# Patient Record
Sex: FEMALE | Race: WHITE | Employment: UNEMPLOYED | URBAN - NONMETROPOLITAN AREA
[De-identification: names, ages, dates, MRNs, and addresses within clinical notes are randomized per-mention and may not be internally consistent; named-entity substitution may affect disease eponyms.]

---

## 2017-01-21 ENCOUNTER — HOSPITAL ENCOUNTER (OUTPATIENT)
Dept: LAB | Age: 82
Discharge: HOME | End: 2017-01-21
Attending: INTERNAL MEDICINE | Admitting: INTERNAL MEDICINE

## 2017-01-21 LAB
ALBUMIN SERPL-MCNC: 4.3 GM/DL (ref 3.4–5)
ALP BLD-CCNC: 77 IU/L (ref 40–129)
ALT SERPL-CCNC: 17 U/L (ref 10–40)
ANION GAP SERPL CALCULATED.3IONS-SCNC: 6 MMOL/L (ref 4–16)
AST SERPL-CCNC: 23 IU/L (ref 15–37)
BILIRUB SERPL-MCNC: 0.2 MG/DL (ref 0–1)
BUN BLDV-MCNC: 23 MG/DL (ref 6–23)
CALCIUM SERPL-MCNC: 8.9 MG/DL (ref 8.3–10.6)
CHLORIDE BLD-SCNC: 107 MMOL/L (ref 99–110)
CO2: 30 MMOL/L (ref 21–32)
CREAT SERPL-MCNC: 0.8 MG/DL (ref 0.6–1.1)
GFR AFRICAN AMERICAN: >60 ML/MIN/1.73M2
GFR NON-AFRICAN AMERICAN: >60 ML/MIN/1.73M2
GLUCOSE FASTING: 97 MG/DL (ref 70–99)
POTASSIUM SERPL-SCNC: 4 MMOL/L (ref 3.5–5.1)
SODIUM BLD-SCNC: 143 MMOL/L (ref 135–145)
TOTAL PROTEIN: 7.2 GM/DL (ref 6.4–8.2)

## 2017-01-22 LAB
CHOLESTEROL: 197 MG/DL
HDLC SERPL-MCNC: 69 MG/DL
LDL CHOLESTEROL DIRECT: 120 MG/DL
TRIGL SERPL-MCNC: 93 MG/DL

## 2022-05-18 ENCOUNTER — HOSPITAL ENCOUNTER (INPATIENT)
Age: 87
LOS: 2 days | Discharge: HOME | DRG: 191 | End: 2022-05-20
Attending: EMERGENCY MEDICINE | Admitting: INTERNAL MEDICINE
Payer: MEDICARE

## 2022-05-18 ENCOUNTER — APPOINTMENT (OUTPATIENT)
Dept: GENERAL RADIOLOGY | Age: 87
DRG: 191 | End: 2022-05-18
Payer: MEDICARE

## 2022-05-18 DIAGNOSIS — J44.1 ACUTE EXACERBATION OF CHRONIC OBSTRUCTIVE PULMONARY DISEASE (COPD) (HCC): Primary | ICD-10-CM

## 2022-05-18 DIAGNOSIS — J44.1 COPD EXACERBATION (HCC): ICD-10-CM

## 2022-05-18 DIAGNOSIS — R09.02 HYPOXIA: ICD-10-CM

## 2022-05-18 PROBLEM — J96.21 ACUTE ON CHRONIC RESPIRATORY FAILURE WITH HYPOXIA (HCC): Status: ACTIVE | Noted: 2022-05-18

## 2022-05-18 PROBLEM — J44.9 COPD (CHRONIC OBSTRUCTIVE PULMONARY DISEASE) (HCC): Status: ACTIVE | Noted: 2022-05-18

## 2022-05-18 PROBLEM — F03.90 DEMENTIA (HCC): Status: ACTIVE | Noted: 2022-05-18

## 2022-05-18 LAB
ANION GAP SERPL CALCULATED.3IONS-SCNC: 13 MMOL/L (ref 4–16)
BASOPHILS ABSOLUTE: 0 K/CU MM
BASOPHILS RELATIVE PERCENT: 0.2 % (ref 0–1)
BUN BLDV-MCNC: 31 MG/DL (ref 6–23)
CALCIUM SERPL-MCNC: 8.5 MG/DL (ref 8.3–10.6)
CHLORIDE BLD-SCNC: 103 MMOL/L (ref 99–110)
CO2: 24 MMOL/L (ref 21–32)
CREAT SERPL-MCNC: 0.9 MG/DL (ref 0.6–1.1)
D DIMER: 1150 NG/ML(DDU)
DIFFERENTIAL TYPE: ABNORMAL
EOSINOPHILS ABSOLUTE: 0 K/CU MM
EOSINOPHILS RELATIVE PERCENT: 0 % (ref 0–3)
GFR AFRICAN AMERICAN: >60 ML/MIN/1.73M2
GFR NON-AFRICAN AMERICAN: 59 ML/MIN/1.73M2
GLUCOSE BLD-MCNC: 191 MG/DL (ref 70–99)
HCT VFR BLD CALC: 48.8 % (ref 37–47)
HEMOGLOBIN: 15 GM/DL (ref 12.5–16)
IMMATURE NEUTROPHIL %: 0.4 % (ref 0–0.43)
LYMPHOCYTES ABSOLUTE: 0.6 K/CU MM
LYMPHOCYTES RELATIVE PERCENT: 6.7 % (ref 24–44)
MCH RBC QN AUTO: 30.7 PG (ref 27–31)
MCHC RBC AUTO-ENTMCNC: 30.7 % (ref 32–36)
MCV RBC AUTO: 100 FL (ref 78–100)
MONOCYTES ABSOLUTE: 0.5 K/CU MM
MONOCYTES RELATIVE PERCENT: 5.1 % (ref 0–4)
PDW BLD-RTO: 14.1 % (ref 11.7–14.9)
PLATELET # BLD: 205 K/CU MM (ref 140–440)
PMV BLD AUTO: 9 FL (ref 7.5–11.1)
POTASSIUM SERPL-SCNC: 4.3 MMOL/L (ref 3.5–5.1)
PRO-BNP: 1813 PG/ML
RBC # BLD: 4.88 M/CU MM (ref 4.2–5.4)
SEGMENTED NEUTROPHILS ABSOLUTE COUNT: 8.1 K/CU MM
SEGMENTED NEUTROPHILS RELATIVE PERCENT: 87.6 % (ref 36–66)
SODIUM BLD-SCNC: 140 MMOL/L (ref 135–145)
TOTAL IMMATURE NEUTOROPHIL: 0.04 K/CU MM
TROPONIN T: <0.01 NG/ML
WBC # BLD: 9.3 K/CU MM (ref 4–10.5)

## 2022-05-18 PROCEDURE — 85379 FIBRIN DEGRADATION QUANT: CPT

## 2022-05-18 PROCEDURE — 2140000000 HC CCU INTERMEDIATE R&B

## 2022-05-18 PROCEDURE — 83880 ASSAY OF NATRIURETIC PEPTIDE: CPT

## 2022-05-18 PROCEDURE — 85025 COMPLETE CBC W/AUTO DIFF WBC: CPT

## 2022-05-18 PROCEDURE — 6360000002 HC RX W HCPCS: Performed by: EMERGENCY MEDICINE

## 2022-05-18 PROCEDURE — 6370000000 HC RX 637 (ALT 250 FOR IP): Performed by: EMERGENCY MEDICINE

## 2022-05-18 PROCEDURE — 84484 ASSAY OF TROPONIN QUANT: CPT

## 2022-05-18 PROCEDURE — 94640 AIRWAY INHALATION TREATMENT: CPT

## 2022-05-18 PROCEDURE — 93005 ELECTROCARDIOGRAM TRACING: CPT | Performed by: INTERNAL MEDICINE

## 2022-05-18 PROCEDURE — 96374 THER/PROPH/DIAG INJ IV PUSH: CPT

## 2022-05-18 PROCEDURE — 71045 X-RAY EXAM CHEST 1 VIEW: CPT

## 2022-05-18 PROCEDURE — 80048 BASIC METABOLIC PNL TOTAL CA: CPT

## 2022-05-18 PROCEDURE — 99285 EMERGENCY DEPT VISIT HI MDM: CPT

## 2022-05-18 PROCEDURE — 0202U NFCT DS 22 TRGT SARS-COV-2: CPT

## 2022-05-18 RX ORDER — LEVOCETIRIZINE DIHYDROCHLORIDE 5 MG/1
5 TABLET, FILM COATED ORAL NIGHTLY
COMMUNITY

## 2022-05-18 RX ORDER — ALBUTEROL SULFATE 2.5 MG/3ML
2.5 SOLUTION RESPIRATORY (INHALATION) ONCE
Status: COMPLETED | OUTPATIENT
Start: 2022-05-18 | End: 2022-05-18

## 2022-05-18 RX ORDER — PREDNISONE 10 MG/1
10 TABLET ORAL DAILY
COMMUNITY

## 2022-05-18 RX ORDER — BUDESONIDE AND FORMOTEROL FUMARATE DIHYDRATE 160; 4.5 UG/1; UG/1
2 AEROSOL RESPIRATORY (INHALATION) 2 TIMES DAILY
COMMUNITY

## 2022-05-18 RX ORDER — IPRATROPIUM BROMIDE AND ALBUTEROL SULFATE 2.5; .5 MG/3ML; MG/3ML
1 SOLUTION RESPIRATORY (INHALATION) ONCE
Status: COMPLETED | OUTPATIENT
Start: 2022-05-18 | End: 2022-05-18

## 2022-05-18 RX ORDER — DEXAMETHASONE SODIUM PHOSPHATE 10 MG/ML
10 INJECTION, SOLUTION INTRAMUSCULAR; INTRAVENOUS ONCE
Status: COMPLETED | OUTPATIENT
Start: 2022-05-18 | End: 2022-05-18

## 2022-05-18 RX ORDER — DONEPEZIL HYDROCHLORIDE 10 MG/1
10 TABLET, FILM COATED ORAL NIGHTLY
COMMUNITY

## 2022-05-18 RX ORDER — M-VIT,TX,IRON,MINS/CALC/FOLIC 27MG-0.4MG
1 TABLET ORAL DAILY
COMMUNITY

## 2022-05-18 RX ORDER — MIRTAZAPINE 15 MG/1
15 TABLET, FILM COATED ORAL NIGHTLY
COMMUNITY

## 2022-05-18 RX ADMIN — IPRATROPIUM BROMIDE AND ALBUTEROL SULFATE 1 AMPULE: 2.5; .5 SOLUTION RESPIRATORY (INHALATION) at 21:18

## 2022-05-18 RX ADMIN — DEXAMETHASONE SODIUM PHOSPHATE 10 MG: 10 INJECTION INTRAMUSCULAR; INTRAVENOUS at 21:22

## 2022-05-18 RX ADMIN — ALBUTEROL SULFATE 2.5 MG: 2.5 SOLUTION RESPIRATORY (INHALATION) at 21:19

## 2022-05-18 ASSESSMENT — ENCOUNTER SYMPTOMS
EYES NEGATIVE: 1
SHORTNESS OF BREATH: 1
ABDOMINAL PAIN: 0
COUGH: 1
HEMOPTYSIS: 0
SPUTUM PRODUCTION: 0
WHEEZING: 1
GASTROINTESTINAL NEGATIVE: 1
SORE THROAT: 0

## 2022-05-18 ASSESSMENT — LIFESTYLE VARIABLES: HOW OFTEN DO YOU HAVE A DRINK CONTAINING ALCOHOL: NEVER

## 2022-05-19 ENCOUNTER — APPOINTMENT (OUTPATIENT)
Dept: ULTRASOUND IMAGING | Age: 87
DRG: 191 | End: 2022-05-19
Payer: MEDICARE

## 2022-05-19 ENCOUNTER — APPOINTMENT (OUTPATIENT)
Dept: CT IMAGING | Age: 87
DRG: 191 | End: 2022-05-19
Payer: MEDICARE

## 2022-05-19 LAB
ADENOVIRUS DETECTION BY PCR: NOT DETECTED
ALBUMIN SERPL-MCNC: 3.6 GM/DL (ref 3.4–5)
ALP BLD-CCNC: 86 IU/L (ref 40–129)
ALT SERPL-CCNC: 27 U/L (ref 10–40)
ANION GAP SERPL CALCULATED.3IONS-SCNC: 11 MMOL/L (ref 4–16)
APTT: 20.6 SECONDS (ref 25.1–37.1)
AST SERPL-CCNC: 29 IU/L (ref 15–37)
BASOPHILS ABSOLUTE: 0 K/CU MM
BASOPHILS RELATIVE PERCENT: 0.2 % (ref 0–1)
BILIRUB SERPL-MCNC: 0.2 MG/DL (ref 0–1)
BORDETELLA PARAPERTUSSIS BY PCR: NOT DETECTED
BORDETELLA PERTUSSIS PCR: NOT DETECTED
BUN BLDV-MCNC: 29 MG/DL (ref 6–23)
CALCIUM SERPL-MCNC: 7.9 MG/DL (ref 8.3–10.6)
CHLAMYDOPHILA PNEUMONIA PCR: NOT DETECTED
CHLORIDE BLD-SCNC: 106 MMOL/L (ref 99–110)
CO2: 25 MMOL/L (ref 21–32)
CORONAVIRUS 229E PCR: NOT DETECTED
CORONAVIRUS HKU1 PCR: NOT DETECTED
CORONAVIRUS NL63 PCR: NOT DETECTED
CORONAVIRUS OC43 PCR: NOT DETECTED
CREAT SERPL-MCNC: 0.8 MG/DL (ref 0.6–1.1)
DIFFERENTIAL TYPE: ABNORMAL
EKG ATRIAL RATE: 117 BPM
EKG DIAGNOSIS: NORMAL
EKG P AXIS: 80 DEGREES
EKG P-R INTERVAL: 138 MS
EKG Q-T INTERVAL: 342 MS
EKG QRS DURATION: 92 MS
EKG QTC CALCULATION (BAZETT): 477 MS
EKG R AXIS: 29 DEGREES
EKG T AXIS: 68 DEGREES
EKG VENTRICULAR RATE: 117 BPM
EOSINOPHILS ABSOLUTE: 0 K/CU MM
EOSINOPHILS RELATIVE PERCENT: 0 % (ref 0–3)
GFR AFRICAN AMERICAN: >60 ML/MIN/1.73M2
GFR NON-AFRICAN AMERICAN: >60 ML/MIN/1.73M2
GLUCOSE BLD-MCNC: 129 MG/DL (ref 70–99)
HCT VFR BLD CALC: 45.2 % (ref 37–47)
HEMOGLOBIN: 13.5 GM/DL (ref 12.5–16)
HUMAN METAPNEUMOVIRUS PCR: NOT DETECTED
IMMATURE NEUTROPHIL %: 0.3 % (ref 0–0.43)
INFLUENZA A BY PCR: NOT DETECTED
INFLUENZA A H1 (2009) PCR: NOT DETECTED
INFLUENZA A H1 PANDEMIC PCR: NOT DETECTED
INFLUENZA A H3 PCR: NOT DETECTED
INFLUENZA B BY PCR: NOT DETECTED
INR BLD: 1.02 INDEX
LV EF: 53 %
LVEF MODALITY: NORMAL
LYMPHOCYTES ABSOLUTE: 0.6 K/CU MM
LYMPHOCYTES RELATIVE PERCENT: 8.7 % (ref 24–44)
MCH RBC QN AUTO: 30.3 PG (ref 27–31)
MCHC RBC AUTO-ENTMCNC: 29.9 % (ref 32–36)
MCV RBC AUTO: 101.6 FL (ref 78–100)
MONOCYTES ABSOLUTE: 0.1 K/CU MM
MONOCYTES RELATIVE PERCENT: 1.1 % (ref 0–4)
MYCOPLASMA PNEUMONIAE PCR: NOT DETECTED
PARAINFLUENZA 1 PCR: NOT DETECTED
PARAINFLUENZA 2 PCR: NOT DETECTED
PARAINFLUENZA 3 PCR: DETECTED
PARAINFLUENZA 4 PCR: NOT DETECTED
PDW BLD-RTO: 14 % (ref 11.7–14.9)
PLATELET # BLD: 192 K/CU MM (ref 140–440)
PMV BLD AUTO: 9.1 FL (ref 7.5–11.1)
POTASSIUM SERPL-SCNC: 4.6 MMOL/L (ref 3.5–5.1)
PROTHROMBIN TIME: 12.7 SECONDS (ref 11.7–14.5)
RBC # BLD: 4.45 M/CU MM (ref 4.2–5.4)
RHINOVIRUS ENTEROVIRUS PCR: NOT DETECTED
RSV PCR: NOT DETECTED
SARS-COV-2: NOT DETECTED
SEGMENTED NEUTROPHILS ABSOLUTE COUNT: 5.9 K/CU MM
SEGMENTED NEUTROPHILS RELATIVE PERCENT: 89.7 % (ref 36–66)
SODIUM BLD-SCNC: 142 MMOL/L (ref 135–145)
TOTAL IMMATURE NEUTOROPHIL: 0.02 K/CU MM
TOTAL PROTEIN: 6.4 GM/DL (ref 6.4–8.2)
WBC # BLD: 6.5 K/CU MM (ref 4–10.5)

## 2022-05-19 PROCEDURE — 94664 DEMO&/EVAL PT USE INHALER: CPT

## 2022-05-19 PROCEDURE — 97166 OT EVAL MOD COMPLEX 45 MIN: CPT

## 2022-05-19 PROCEDURE — 6360000002 HC RX W HCPCS: Performed by: PHYSICIAN ASSISTANT

## 2022-05-19 PROCEDURE — 6360000002 HC RX W HCPCS: Performed by: NURSE PRACTITIONER

## 2022-05-19 PROCEDURE — 2140000000 HC CCU INTERMEDIATE R&B

## 2022-05-19 PROCEDURE — 82803 BLOOD GASES ANY COMBINATION: CPT

## 2022-05-19 PROCEDURE — 97535 SELF CARE MNGMENT TRAINING: CPT

## 2022-05-19 PROCEDURE — 93970 EXTREMITY STUDY: CPT

## 2022-05-19 PROCEDURE — 93010 ELECTROCARDIOGRAM REPORT: CPT | Performed by: INTERNAL MEDICINE

## 2022-05-19 PROCEDURE — 2580000003 HC RX 258: Performed by: NURSE PRACTITIONER

## 2022-05-19 PROCEDURE — 85610 PROTHROMBIN TIME: CPT

## 2022-05-19 PROCEDURE — 80053 COMPREHEN METABOLIC PANEL: CPT

## 2022-05-19 PROCEDURE — 36415 COLL VENOUS BLD VENIPUNCTURE: CPT

## 2022-05-19 PROCEDURE — 93306 TTE W/DOPPLER COMPLETE: CPT

## 2022-05-19 PROCEDURE — 6370000000 HC RX 637 (ALT 250 FOR IP): Performed by: NURSE PRACTITIONER

## 2022-05-19 PROCEDURE — 71275 CT ANGIOGRAPHY CHEST: CPT

## 2022-05-19 PROCEDURE — 85730 THROMBOPLASTIN TIME PARTIAL: CPT

## 2022-05-19 PROCEDURE — 94761 N-INVAS EAR/PLS OXIMETRY MLT: CPT

## 2022-05-19 PROCEDURE — 2700000000 HC OXYGEN THERAPY PER DAY

## 2022-05-19 PROCEDURE — 6360000004 HC RX CONTRAST MEDICATION: Performed by: INTERNAL MEDICINE

## 2022-05-19 PROCEDURE — 94640 AIRWAY INHALATION TREATMENT: CPT

## 2022-05-19 PROCEDURE — 85025 COMPLETE CBC W/AUTO DIFF WBC: CPT

## 2022-05-19 RX ORDER — OMEGA-3S/DHA/EPA/FISH OIL/D3 300MG-1000
400 CAPSULE ORAL DAILY
Status: DISCONTINUED | OUTPATIENT
Start: 2022-05-19 | End: 2022-05-20 | Stop reason: HOSPADM

## 2022-05-19 RX ORDER — SODIUM CHLORIDE 0.9 % (FLUSH) 0.9 %
5-40 SYRINGE (ML) INJECTION EVERY 12 HOURS SCHEDULED
Status: DISCONTINUED | OUTPATIENT
Start: 2022-05-19 | End: 2022-05-20 | Stop reason: HOSPADM

## 2022-05-19 RX ORDER — ONDANSETRON 2 MG/ML
4 INJECTION INTRAMUSCULAR; INTRAVENOUS EVERY 6 HOURS PRN
Status: DISCONTINUED | OUTPATIENT
Start: 2022-05-19 | End: 2022-05-20 | Stop reason: HOSPADM

## 2022-05-19 RX ORDER — SODIUM CHLORIDE 0.9 % (FLUSH) 0.9 %
5-40 SYRINGE (ML) INJECTION PRN
Status: DISCONTINUED | OUTPATIENT
Start: 2022-05-19 | End: 2022-05-20 | Stop reason: HOSPADM

## 2022-05-19 RX ORDER — IPRATROPIUM BROMIDE AND ALBUTEROL SULFATE 2.5; .5 MG/3ML; MG/3ML
1 SOLUTION RESPIRATORY (INHALATION) EVERY 4 HOURS PRN
Status: DISCONTINUED | OUTPATIENT
Start: 2022-05-19 | End: 2022-05-20 | Stop reason: HOSPADM

## 2022-05-19 RX ORDER — ALBUTEROL SULFATE 2.5 MG/3ML
2.5 SOLUTION RESPIRATORY (INHALATION)
Status: DISCONTINUED | OUTPATIENT
Start: 2022-05-19 | End: 2022-05-20 | Stop reason: HOSPADM

## 2022-05-19 RX ORDER — BUDESONIDE AND FORMOTEROL FUMARATE DIHYDRATE 160; 4.5 UG/1; UG/1
2 AEROSOL RESPIRATORY (INHALATION) 2 TIMES DAILY
Status: DISCONTINUED | OUTPATIENT
Start: 2022-05-19 | End: 2022-05-19 | Stop reason: CLARIF

## 2022-05-19 RX ORDER — PREDNISONE 20 MG/1
40 TABLET ORAL DAILY
Status: DISCONTINUED | OUTPATIENT
Start: 2022-05-20 | End: 2022-05-20 | Stop reason: HOSPADM

## 2022-05-19 RX ORDER — SODIUM CHLORIDE 9 MG/ML
INJECTION, SOLUTION INTRAVENOUS PRN
Status: DISCONTINUED | OUTPATIENT
Start: 2022-05-19 | End: 2022-05-20 | Stop reason: HOSPADM

## 2022-05-19 RX ORDER — SODIUM CHLORIDE 9 MG/ML
INJECTION, SOLUTION INTRAVENOUS CONTINUOUS
Status: ACTIVE | OUTPATIENT
Start: 2022-05-19 | End: 2022-05-19

## 2022-05-19 RX ORDER — ACETAMINOPHEN 325 MG/1
650 TABLET ORAL EVERY 6 HOURS PRN
Status: DISCONTINUED | OUTPATIENT
Start: 2022-05-19 | End: 2022-05-20 | Stop reason: HOSPADM

## 2022-05-19 RX ORDER — PREDNISONE 20 MG/1
40 TABLET ORAL DAILY
Status: DISCONTINUED | OUTPATIENT
Start: 2022-05-20 | End: 2022-05-19

## 2022-05-19 RX ORDER — DONEPEZIL HYDROCHLORIDE 10 MG/1
10 TABLET, FILM COATED ORAL NIGHTLY
Status: DISCONTINUED | OUTPATIENT
Start: 2022-05-19 | End: 2022-05-20 | Stop reason: HOSPADM

## 2022-05-19 RX ORDER — ACETAMINOPHEN 650 MG/1
650 SUPPOSITORY RECTAL EVERY 6 HOURS PRN
Status: DISCONTINUED | OUTPATIENT
Start: 2022-05-19 | End: 2022-05-20 | Stop reason: HOSPADM

## 2022-05-19 RX ORDER — POLYETHYLENE GLYCOL 3350 17 G/17G
17 POWDER, FOR SOLUTION ORAL DAILY PRN
Status: DISCONTINUED | OUTPATIENT
Start: 2022-05-19 | End: 2022-05-20 | Stop reason: HOSPADM

## 2022-05-19 RX ORDER — LEVOCETIRIZINE DIHYDROCHLORIDE 5 MG/1
2.5 TABLET, FILM COATED ORAL DAILY
Status: DISCONTINUED | OUTPATIENT
Start: 2022-05-19 | End: 2022-05-19 | Stop reason: CLARIF

## 2022-05-19 RX ORDER — ONDANSETRON 4 MG/1
4 TABLET, ORALLY DISINTEGRATING ORAL EVERY 8 HOURS PRN
Status: DISCONTINUED | OUTPATIENT
Start: 2022-05-19 | End: 2022-05-20 | Stop reason: HOSPADM

## 2022-05-19 RX ORDER — HEPARIN SODIUM 5000 [USP'U]/ML
5000 INJECTION, SOLUTION INTRAVENOUS; SUBCUTANEOUS 2 TIMES DAILY
Status: DISCONTINUED | OUTPATIENT
Start: 2022-05-19 | End: 2022-05-19

## 2022-05-19 RX ORDER — ENOXAPARIN SODIUM 100 MG/ML
40 INJECTION SUBCUTANEOUS NIGHTLY
Status: DISCONTINUED | OUTPATIENT
Start: 2022-05-19 | End: 2022-05-20

## 2022-05-19 RX ORDER — M-VIT,TX,IRON,MINS/CALC/FOLIC 27MG-0.4MG
1 TABLET ORAL DAILY
Status: DISCONTINUED | OUTPATIENT
Start: 2022-05-19 | End: 2022-05-20 | Stop reason: HOSPADM

## 2022-05-19 RX ORDER — ASPIRIN 325 MG
325 TABLET ORAL DAILY
Status: DISCONTINUED | OUTPATIENT
Start: 2022-05-19 | End: 2022-05-20 | Stop reason: HOSPADM

## 2022-05-19 RX ORDER — CETIRIZINE HYDROCHLORIDE 5 MG/1
5 TABLET ORAL DAILY
Status: DISCONTINUED | OUTPATIENT
Start: 2022-05-19 | End: 2022-05-20 | Stop reason: HOSPADM

## 2022-05-19 RX ORDER — MIRTAZAPINE 15 MG/1
15 TABLET, FILM COATED ORAL NIGHTLY
Status: DISCONTINUED | OUTPATIENT
Start: 2022-05-19 | End: 2022-05-20 | Stop reason: HOSPADM

## 2022-05-19 RX ORDER — METHYLPREDNISOLONE SODIUM SUCCINATE 40 MG/ML
40 INJECTION, POWDER, LYOPHILIZED, FOR SOLUTION INTRAMUSCULAR; INTRAVENOUS EVERY 6 HOURS
Status: DISCONTINUED | OUTPATIENT
Start: 2022-05-19 | End: 2022-05-19

## 2022-05-19 RX ADMIN — MOMETASONE FUROATE AND FORMOTEROL FUMARATE DIHYDRATE 2 PUFF: 200; 5 AEROSOL RESPIRATORY (INHALATION) at 20:02

## 2022-05-19 RX ADMIN — HEPARIN SODIUM 5000 UNITS: 5000 INJECTION INTRAVENOUS; SUBCUTANEOUS at 08:50

## 2022-05-19 RX ADMIN — SODIUM CHLORIDE, PRESERVATIVE FREE 10 ML: 5 INJECTION INTRAVENOUS at 21:15

## 2022-05-19 RX ADMIN — METHYLPREDNISOLONE SODIUM SUCCINATE 40 MG: 40 INJECTION, POWDER, FOR SOLUTION INTRAMUSCULAR; INTRAVENOUS at 01:54

## 2022-05-19 RX ADMIN — SODIUM CHLORIDE, PRESERVATIVE FREE 10 ML: 5 INJECTION INTRAVENOUS at 08:49

## 2022-05-19 RX ADMIN — DONEPEZIL HYDROCHLORIDE 10 MG: 10 TABLET, FILM COATED ORAL at 01:53

## 2022-05-19 RX ADMIN — CETIRIZINE HYDROCHLORIDE 5 MG: 5 TABLET ORAL at 08:55

## 2022-05-19 RX ADMIN — ENOXAPARIN SODIUM 40 MG: 100 INJECTION SUBCUTANEOUS at 21:14

## 2022-05-19 RX ADMIN — MOMETASONE FUROATE AND FORMOTEROL FUMARATE DIHYDRATE 2 PUFF: 200; 5 AEROSOL RESPIRATORY (INHALATION) at 07:25

## 2022-05-19 RX ADMIN — ALBUTEROL SULFATE 2.5 MG: 2.5 SOLUTION RESPIRATORY (INHALATION) at 07:25

## 2022-05-19 RX ADMIN — ASPIRIN 325 MG: 325 TABLET ORAL at 08:55

## 2022-05-19 RX ADMIN — ALBUTEROL SULFATE 2.5 MG: 2.5 SOLUTION RESPIRATORY (INHALATION) at 20:02

## 2022-05-19 RX ADMIN — SODIUM CHLORIDE: 9 INJECTION, SOLUTION INTRAVENOUS at 03:33

## 2022-05-19 RX ADMIN — METHYLPREDNISOLONE SODIUM SUCCINATE 40 MG: 40 INJECTION, POWDER, FOR SOLUTION INTRAMUSCULAR; INTRAVENOUS at 08:55

## 2022-05-19 RX ADMIN — IOPAMIDOL 75 ML: 755 INJECTION, SOLUTION INTRAVENOUS at 01:20

## 2022-05-19 RX ADMIN — ALBUTEROL SULFATE 2.5 MG: 2.5 SOLUTION RESPIRATORY (INHALATION) at 16:25

## 2022-05-19 RX ADMIN — DONEPEZIL HYDROCHLORIDE 10 MG: 10 TABLET, FILM COATED ORAL at 21:14

## 2022-05-19 RX ADMIN — MIRTAZAPINE 15 MG: 15 TABLET, FILM COATED ORAL at 21:14

## 2022-05-19 RX ADMIN — HEPARIN SODIUM 5000 UNITS: 5000 INJECTION INTRAVENOUS; SUBCUTANEOUS at 03:05

## 2022-05-19 RX ADMIN — Medication 1 TABLET: at 08:55

## 2022-05-19 RX ADMIN — CHOLECALCIFEROL (VITAMIN D3) 10 MCG (400 UNIT) TABLET 400 UNITS: at 08:55

## 2022-05-19 RX ADMIN — ALBUTEROL SULFATE 2.5 MG: 2.5 SOLUTION RESPIRATORY (INHALATION) at 11:15

## 2022-05-19 ASSESSMENT — PAIN SCALES - GENERAL
PAINLEVEL_OUTOF10: 0

## 2022-05-19 NOTE — PROGRESS NOTES
Patient down for CT scan per wheelchair and oxygen per [facility] and [facility] , Radiology. Verbal order received from Nataliya CERVANTES and relayed to Tee Ortega RT, to do ABG's until after CT results are in.      Roman Olivia RN  1:38 AM

## 2022-05-19 NOTE — PLAN OF CARE
Problem: Discharge Planning  Goal: Discharge to home or other facility with appropriate resources  Outcome: Progressing     Problem: Pain  Goal: Verbalizes/displays adequate comfort level or baseline comfort level  Outcome: Progressing  Flowsheets (Taken 5/19/2022 1847)  Verbalizes/displays adequate comfort level or baseline comfort level:   Encourage patient to monitor pain and request assistance   Assess pain using appropriate pain scale   Implement non-pharmacological measures as appropriate and evaluate response     Problem: Safety - Adult  Goal: Free from fall injury  Outcome: Progressing     Problem: Respiratory - Adult  Goal: Achieves optimal ventilation and oxygenation  Outcome: Progressing  Flowsheets (Taken 5/19/2022 1847)  Achieves optimal ventilation and oxygenation:   Assess for changes in respiratory status   Oxygen supplementation based on oxygen saturation or arterial blood gases     Problem: Skin/Tissue Integrity - Adult  Goal: Skin integrity remains intact  Outcome: Progressing  Flowsheets (Taken 5/19/2022 1845)  Skin Integrity Remains Intact: Monitor for areas of redness and/or skin breakdown  Goal: Incisions, wounds, or drain sites healing without S/S of infection  Outcome: Progressing     Problem: Skin/Tissue Integrity  Goal: Absence of new skin breakdown  Description: 1. Monitor for areas of redness and/or skin breakdown  2. Assess vascular access sites hourly  3. Every 4-6 hours minimum:  Change oxygen saturation probe site  4. Every 4-6 hours:  If on nasal continuous positive airway pressure, respiratory therapy assess nares and determine need for appliance change or resting period.   Outcome: Progressing     Problem: Nutrition Deficit:  Goal: Optimize nutritional status  Outcome: Progressing     Problem: Chronic Conditions and Co-morbidities  Goal: Patient's chronic conditions and co-morbidity symptoms are monitored and maintained or improved  Outcome: Progressing     Problem: ABCDS

## 2022-05-19 NOTE — PLAN OF CARE
Problem: Discharge Planning  Goal: Discharge to home or other facility with appropriate resources  Outcome: Progressing  Flowsheets (Taken 5/19/2022 0105)  Discharge to home or other facility with appropriate resources: Identify barriers to discharge with patient and caregiver     Problem: Pain  Goal: Verbalizes/displays adequate comfort level or baseline comfort level  Outcome: Progressing     Problem: Safety - Adult  Goal: Free from fall injury  Outcome: Progressing     Problem: Respiratory - Adult  Goal: Achieves optimal ventilation and oxygenation  Outcome: Progressing     Problem: Skin/Tissue Integrity - Adult  Goal: Skin integrity remains intact  Outcome: Progressing  Goal: Incisions, wounds, or drain sites healing without S/S of infection  Outcome: Progressing     Problem: Skin/Tissue Integrity  Goal: Absence of new skin breakdown  Description: 1. Monitor for areas of redness and/or skin breakdown  2. Assess vascular access sites hourly  3. Every 4-6 hours minimum:  Change oxygen saturation probe site  4. Every 4-6 hours:  If on nasal continuous positive airway pressure, respiratory therapy assess nares and determine need for appliance change or resting period.   Outcome: Progressing     Problem: Nutrition Deficit:  Goal: Optimize nutritional status  Outcome: Progressing     Problem: Chronic Conditions and Co-morbidities  Goal: Patient's chronic conditions and co-morbidity symptoms are monitored and maintained or improved  Outcome: Progressing     Problem: ABCDS Injury Assessment  Goal: Absence of physical injury  Outcome: Progressing

## 2022-05-19 NOTE — PROGRESS NOTES
Skin assessment completed by this RN and Joseluis Pereyra , nurse's aide. Patient noted to have bilateral upper extremity ecchymosis, non-blanchable buttocks/coccyx, dry bilateral LE, abrasion/skin tear/scab left shin and mid back . No other skin concerns noted.      Rocio Parham RN  2:34 AM

## 2022-05-19 NOTE — PROGRESS NOTES
0030-Cornelia RN notified this RN  that Chinidu NP called and that patient  needs a STAT CT scan. She reported that the NP okayed no telemetry necessary. This RN notified the patient of needed scan and reason for scan. She is agreeable. She is educated and agreeable to go on no telemetry.      Magy Agee RN  2:37 AM

## 2022-05-19 NOTE — PROGRESS NOTES
Nataliya NP notified of CT results. Ordered to give on dose of ordered Heparin per [facility], and that he will discuss with Physician in morning. He ordered that no ABG's are needed. Radha CHARLES notified by this RN.      Александр Townsend RN  2:55 AM

## 2022-05-19 NOTE — CARE COORDINATION
CM met with the patient for discharge planning, patient stated that she is feeling better this morning. Patient lives at home with her daughter, has insurance with Rx coverage & PCP, stated that she is independent with ADL's, and no longer drives (daughter provides transportation as needed). Patient stated that she uses a cane at home but only as needed. Patient does not require home oxygen but does use an inhaler and nebulizer machine at home. Patient plans to return home upon discharge and is unable to identify any needs at this time. CM available if needs arise.

## 2022-05-19 NOTE — ED NOTES
No change in condition. Daughter states she looks better now. Another warm blanket given. Symptoms started Monday evening. Hospitalist Wayne NP at bedside.        Casey Dyer RN  05/18/22 5125

## 2022-05-19 NOTE — PROGRESS NOTES
Occupational Therapy  Facility/Department: [facility] UNIT  Occupational Therapy Initial Assessment    Name: Beba Jennings  : 1935  MRN: [id number]  Date of Service: 2022    Discharge Recommendations:  Home with assist PRN  OT Equipment Recommendations  Equipment Needed: No       Patient Diagnosis(es): The primary encounter diagnosis was Acute exacerbation of chronic obstructive pulmonary disease (COPD) ([facility] [address] Diagnoses of Hypoxia and COPD exacerbation ([facility] [address] were also pertinent to this visit. Past Medical History:  has a past medical history of COPD (chronic obstructive pulmonary disease) ([facility] [address] Hypertension, and Osteoporosis. Past Surgical History:  has a past surgical history that includes Hysterectomy; Abdomen surgery; Tonsillectomy; and Abdominal aortic aneurysm repair. Treatment Diagnosis: Decreased endurance      Assessment   Performance deficits / Impairments: Decreased functional mobility ; Decreased endurance;Decreased strength;Decreased ADL status  Assessment: Pt is a pleasant  80 y.o. female  admitted to [facility] with COPD exacerbation ([facility] [address] She presents with decreased endurance for functional mobility and ADLs. It is recommended she receive OT to address improving endurance, strength and education on energy conservation techniques to maximize indep with ADLs. Treatment Diagnosis: Decreased endurance  Prognosis: Good  Decision Making: Medium Complexity  REQUIRES OT FOLLOW-UP: Yes  Activity Tolerance  Activity Tolerance: Patient Tolerated treatment well  Activity Tolerance Comments: Upon exertion for toileting and trf to chair Pt's O2 dropped to low to mid 80s on 1L of O2 however would increase quickly to 90s with cues for PLB.         Plan   Plan  Times per Week: 2+  Current Treatment Recommendations: Strengthening,Functional mobility training,Endurance training,Patient/Caregiver education & training,Self-Care / ADL,Equipment evaluation, education, & procurement,Safety education & training     Restrictions  Restrictions/Precautions  Restrictions/Precautions: Contact Precautions  Required Braces or Orthoses?: No  Position Activity Restriction  Other position/activity restrictions: Tele, 1L of O2    Subjective   General  Patient assessed for rehabilitation services?: Yes  Family / Caregiver Present: Yes (Pt's daughter)  Subjective  Subjective: Pt reports \"I am feeling better\"  General Comment  Comments: Pt presents awake supine in bed on 1L of O2     Social/Functional History  Social/Functional History  Lives With: Daughter  Type of Home: House  Home Layout: One level  Home Access: Stairs to enter with rails  Entrance Stairs - Number of Steps: 2 steps up to porch  Bathroom Shower/Tub: Tub/Shower unit  [employer]: Standard  Bathroom Equipment: Shower chair  Home Equipment: Cane (Nebulizer)  Has the patient had two or more falls in the past year or any fall with injury in the past year?: No  Receives Help From: Family  ADL Assistance: Needs assistance (Pt's daughter assist with trf in/out of shower but reports Pt is indep with bathing/dressing)  Bath: Contact guard assistance (Daughter assists her to get into shower then Pt does washing indep)  Dressing: Independent  Grooming: Independent  Feeding: Independent  Toileting: Independent  Homemaking Assistance: Needs assistance  Homemaking Responsibilities: Yes  Meal Prep Responsibility: Secondary (Pt gets Meals on Wheels and daughter fixes her meals she can microwave)  Laundry Responsibility: Primary (Pt reports she does her own laundry)  Cleaning Responsibility: Secondary (Pt makes her bed and she helps her daughter with doing dishes and dusting sometimes)  Shopping Responsibility: Secondary (Pt and daughter go shopping together)  Ambulation Assistance: Needs assistance (Pt uses a cane in the house occasionally when she feels unsteady)  Transfer Assistance: Independent  Active : No  Leisure & Hobbies: Pt does things around the house  IADL Comments: Daughter reports she works 2nd shift so Pt is alone while she is at work. Daughter makes sure she has meals to heat up in microwave. Objective   Pulse: 85  Heart Rate Source: Monitor  BP: (!) 141/62  BP Location: Left upper arm  BP Method: Automatic  MAP (Calculated): 88.33  Resp: 24  SpO2: 93 %  O2 Device: Nasal cannula  Vision Exceptions: Wears glasses at all times  Hearing: Exceptions to [place] [facility] (Has hearing aides but doesn't wear them)  Hearing Exceptions: Hard of hearing/hearing concerns;Bilateral hearing aid       Observation/Palpation  Observation: Pt awake supine in bed with 1L of O2  Body Mechanics: Noted resting and intention tremors  Safety Devices  Type of Devices: All rommel prominences offloaded;Gait belt;Nurse notified; Left in chair; All fall risk precautions in place;Call light within reach; Chair alarm in place  Restraints  Restraints Initially in Place: No  Balance  Sitting: Intact  Standing: Intact  Toilet Transfers  Toilet - Technique: Ambulating  Equipment Used: Standard toilet  Toilet Transfer: Contact guard assistance  AROM: Within functional limits  Strength: Generally decreased, functional  Coordination: Within functional limits  Tone: Normal  Sensation: Impaired (Pt reports she will get tingling in R hand)  ADL  Feeding: Setup;Modified independent   Grooming: Independent  UE Bathing: Stand by assistance  LE Bathing: Stand by assistance  UE Dressing: Independent  LE Dressing: Stand by assistance  Toileting: Contact guard assistance  Additional Comments: Based on observation of Pt current functional mobility, strength and endurance status. Pt became SOB upon completing toileting.  Cued Pt on PLB and Pt able to recover        Bed mobility  Sit to Supine: Modified independent  Scooting: Modified independent  Bed Mobility Comments: HOB elevated  Transfers  Stand Step Transfers: Contact guard assistance  Sit to stand: Contact guard assistance  Stand to sit: Contact guard assistance     Cognition  Overall Cognitive Status: [facility]                  Education Given To: Patient; Family  Education Provided: Role of Therapy;Plan of Care;Precautions;Transfer Training;Energy Conservation  Education Method: Demonstration;Verbal  Barriers to Learning: None  Education Outcome: Verbalized understanding;Demonstrated understanding                        G-Code     OutComes Score                                                  AM-PAC Score             Goals  Short Term Goals  Time Frame for Short term goals: Until DC or goals met  Short Term Goal 1: Pt will complete trfs mod I  Short Term Goal 2: Pt will complete UE/LE bathing/dressing with S following energy conservation techniques  Short Term Goal 3: Pt will tolerate 5+ min of standing/functional mobility during ADLs/therax w/o increased SOB. Short Term Goal 4: Pt will complete 10+ min of BUE therex to increase strenth/endurance for ADLs       Therapy Time   Individual Concurrent Group Co-treatment   Time In 1210         Time Out 1245         Minutes 35         Timed Code Treatment Minutes: [address] [facility] MIRI Cancino, OTR/L [id number]

## 2022-05-19 NOTE — PROGRESS NOTES
V2.0  [facility] Note      Name:  Kristen Valdovinos /Age/Sex: 1935  (80 y.o. female)   MRN & CSN:  [id number] & [id number] Encounter Date/Time: 2022 10:27 AM EDT    Location:   PCP: Gumaro Santana MD       Hospital Day: 2    Assessment and Plan:   Kristen Valdovinos is a 80 y.o. female with pmh of COPD, former tobacco user, CHF, dementia, insomnia, CHF, vitamin D deficiency, osteoporosis, HTN who presents with COPD exacerbation ([facility] [address]      Plan:    # COPD with acute exacerbation with hypoxemia  Continue nebulized bronchodilators  Continue Symbicort  Solu-Medrol 40 mg IV x24 hours then prednisone 40 mg p.o. daily  Continue oxygen at 3 L to maintain O2 saturation 90% - 92% or greater, decrease with improvement. Does not wear home O2. # Parainfluenza 3  Continue supportive care with nebulized medication, oxygen and steroids    # SIRS  Secondary to parainfluenza 3 with associated COPD exacerbation  Continue supportive care. # Elevated D-dimer   Heparin initially ordered  CT PE study negative. DC heparin and start Lovenox 40 mg daily  Continue aspirin 325 daily    # Debility   Consult OT/PT    # Dementia  Continue Aricept 10 mg daily    # CHF   Elevated BNP 1813. Chest x-ray not showing cardiomegaly or pulmonary vascular congestion  We will check cardiac echo study    # HTN   History of HTN, not currently on medication  Will monitor blood pressures. # Insomnia   Continue Remeron 15 mg at at bedtime. # Osteoporosis/vitamin D deficiency   Continue supplemental vitamin D.        Diet ADULT DIET;  Dysphagia - Minced and Moist   DVT Prophylaxis [x] Lovenox, []  Heparin, [] SCDs, [] Ambulation,  [] Eliquis, [] Xarelto  [] Coumadin   Code Status Full Code   Disposition From: Home  Expected Disposition: TBD  Estimated Date of Discharge: TBD  Patient requires continued admission due to acute COPD exacerbation/respiratory distress/hypoxemia requiring oxygen, parainfluenza virus 3   Surrogate Decision Maker/ POA      Subjective:     Chief Complaint: Cough (c/o cough for 2 days) and Shortness of Breath (increased SOB today)       Kristen Valdovinos is a 80 y.o. female who presents with gradual onset and progressive shortness of breath. Onset 48 hours ago with progression over the past 24 hours. Initially taken by daughter urgent care. Discovered respiratory distress and oximetry in room air 80%. Transferred to ED from urgent care. Patient on arrival is noted to be in respiratory distress. This patient was given nebulized bronchodilators and Decadron. Ultimately admitted with oxygen, nebulized bronchodilators and Solu-Medrol. X-ray negative for acute cardiopulmonary abnormality. D-dimer elevated. CT PE study negative for acute pathology. Reporting no chest pain, gastrointestinal or urinary symptoms. Laboratory studies are obtained showing positive respiratory molecular PCR panel positive for parainfluenza 3. D-dimer 1150, BNP 1813, WBC 9.3 and hemoglobin 15.0. BMP showing a BUN 31, creatinine 0.9 and GFR 59. Today, reevaluation reveals the patient still mildly tachypneic and on oxygen 3 L and saturating at 98%. No sputum production. Patient does report breathing more easily. She has personality and speaking in full sentences. Laboratory studies today showing a BUN 29, creatinine 0.8 and GFR now greater than 60. No change in CBC. EKG showing no acute changes. No prior echocardiogram noted. Review of Systems:    Review of Systems    10 point review    Objective: Intake/Output Summary (Last 24 hours) at 5/19/2022 1324  Last data filed at 5/19/2022 0650  Gross per 24 hour   Intake 322.29 ml   Output    Net 322.29 ml        Vitals:   Vitals:    05/19/22 1115   BP:    Pulse:    Resp:    Temp:    SpO2: 93%       Physical Exam:     The patient sitting in bed with oxygen flowing. Conversant with personality. No acute distress. Mildly tachypneic.   General: NAD  Eyes: EOMI  ENT: neck supple  Cardiovascular: Regular rate. Respiratory: Decreased breath sounds throughout the chest.  Occasional wheeze noted. No crackles. Gastrointestinal: Soft, non tender  Genitourinary: no suprapubic tenderness  Musculoskeletal: No edema  Skin: warm, dry  Neuro: Alert. Psych: Mood appropriate.      Medications:   Medications:    aspirin  325 mg Oral Daily    donepezil  10 mg Oral Nightly    mirtazapine  15 mg Oral Nightly    therapeutic multivitamin-minerals  1 tablet Oral Daily    vitamin D3  400 Units Oral Daily    sodium chloride flush  5-40 mL IntraVENous 2 times per day    albuterol  2.5 mg Nebulization Q4H WA    methylPREDNISolone  40 mg IntraVENous Q6H    Followed by   Reji Coleman ON 5/20/2022] predniSONE  40 mg Oral Daily    mometasone-formoterol  2 puff Inhalation BID    cetirizine  5 mg Oral Daily    enoxaparin  40 mg SubCUTAneous Nightly      Infusions:    sodium chloride       PRN Meds: ipratropium-albuterol, 1 vial, Q4H PRN  sodium chloride flush, 5-40 mL, PRN  sodium chloride, , PRN  ondansetron, 4 mg, Q8H PRN   Or  ondansetron, 4 mg, Q6H PRN  polyethylene glycol, 17 g, Daily PRN  acetaminophen, 650 mg, Q6H PRN   Or  acetaminophen, 650 mg, Q6H PRN        Labs      Recent Results (from the past 24 hour(s))   CBC with Auto Differential    Collection Time: 05/18/22  9:16 PM   Result Value Ref Range    WBC 9.3 4.0 - 10.5 K/CU MM    RBC 4.88 4.2 - 5.4 M/CU MM    Hemoglobin 15.0 12.5 - 16.0 GM/DL    Hematocrit 48.8 (H) 37 - 47 %    .0 78 - 100 FL    MCH 30.7 27 - 31 PG    MCHC 30.7 (L) 32.0 - 36.0 %    RDW 14.1 11.7 - 14.9 %    Platelets 601 197 - 396 K/CU MM    MPV 9.0 7.5 - 11.1 FL    Differential Type AUTOMATED DIFFERENTIAL     Segs Relative 87.6 (H) 36 - 66 %    Lymphocytes % 6.7 (L) 24 - 44 %    Monocytes % 5.1 (H) 0 - 4 %    Eosinophils % 0.0 0 - 3 %    Basophils % 0.2 0 - 1 %    Segs Absolute 8.1 K/CU MM    Lymphocytes Absolute 0.6 K/CU MM    Monocytes Absolute 0.5 K/CU MM    Eosinophils Absolute 0.0 K/CU MM    Basophils Absolute 0.0 K/CU MM    Immature Neutrophil % 0.4 0 - 0.43 %    Total Immature Neutrophil 0.04 K/CU MM   Basic Metabolic Panel    Collection Time: 05/18/22  9:16 PM   Result Value Ref Range    Sodium 140 135 - 145 MMOL/L    Potassium 4.3 3.5 - 5.1 MMOL/L    Chloride 103 99 - 110 mMol/L    CO2 24 21 - 32 MMOL/L    Anion Gap 13 4 - 16    BUN 31 (H) 6 - 23 MG/DL    CREATININE 0.9 0.6 - 1.1 MG/DL    Glucose 191 (H) 70 - 99 MG/DL    Calcium 8.5 8.3 - 10.6 MG/DL    GFR Non- 59 (L) >60 mL/min/1.73m2    GFR African American >60 >60 mL/min/1.73m2   Brain Natriuretic Peptide    Collection Time: 05/18/22  9:16 PM   Result Value Ref Range    Pro-BNP 1,813 (H) <300 PG/ML   Troponin    Collection Time: 05/18/22  9:16 PM   Result Value Ref Range    Troponin T <0.010 <0.01 NG/ML   EKG 12 Lead    Collection Time: 05/18/22  9:17 PM   Result Value Ref Range    Ventricular Rate 117 BPM    Atrial Rate 117 BPM    P-R Interval 138 ms    QRS Duration 92 ms    Q-T Interval 342 ms    QTc Calculation (Bazett) 477 ms    P Axis 80 degrees    R Axis 29 degrees    T Axis 68 degrees    Diagnosis       Sinus tachycardia  Possible Inferior infarct , age undetermined  Abnormal ECG  When compared with ECG of 06-JAN-2017 15:13,  ST now depressed in Anterior leads  Nonspecific T wave abnormality now evident in Inferior leads  Nonspecific T wave abnormality now evident in Lateral leads     D-Dimer, Quantitative    Collection Time: 05/18/22 11:35 PM   Result Value Ref Range    D-Dimer, Quant 1150 (H) <230 NG/mL(DDU)   Respiratory Panel, Molecular, with COVID-19 (Restricted: peds pts or suitable admitted adults)    Collection Time: 05/18/22 11:35 PM    Specimen: Nasopharyngeal   Result Value Ref Range    Adenovirus Detection by PCR NOT DETECTED NOT DETECTED    Coronavirus 229E PCR NOT DETECTED NOT DETECTED    Coronavirus HKU1 PCR NOT DETECTED NOT DETECTED    Coronavirus NL63 PCR NOT DETECTED NOT DETECTED    Coronavirus OC43 PCR NOT DETECTED NOT DETECTED    SARS-CoV-2 NOT DETECTED NOT DETECTED    Human Metapneumovirus PCR NOT DETECTED NOT DETECTED    Rhinovirus Enterovirus PCR NOT DETECTED NOT DETECTED    Influenza A by PCR NOT DETECTED NOT DETECTED    Influenza A H1 Pandemic PCR NOT DETECTED NOT DETECTED    Influenza A H1 (2009) PCR NOT DETECTED NOT DETECTED    Influenza A H3 PCR NOT DETECTED NOT DETECTED    Influenza B by PCR NOT DETECTED NOT DETECTED    Parainfluenza 1 PCR NOT DETECTED NOT DETECTED    Parainfluenza 2 PCR NOT DETECTED NOT DETECTED    Parainfluenza 3 PCR DETECTED (A) NOT DETECTED    Parainfluenza 4 PCR NOT DETECTED NOT DETECTED    RSV PCR NOT DETECTED NOT DETECTED    Bordetella parapertussis by PCR NOT DETECTED NOT DETECTED    B Pertussis by PCR NOT DETECTED NOT DETECTED    Chlamydophila Pneumonia PCR NOT DETECTED NOT DETECTED    Mycoplasma pneumo by PCR NOT DETECTED NOT DETECTED   Protime-INR    Collection Time: 05/19/22 12:01 AM   Result Value Ref Range    Protime 12.7 11.7 - 14.5 SECONDS    INR 1.02 INDEX   APTT    Collection Time: 05/19/22 12:01 AM   Result Value Ref Range    aPTT 20.6 (L) 25.1 - 37.1 SECONDS   Comprehensive Metabolic Panel w/ Reflex to MG    Collection Time: 05/19/22  5:50 AM   Result Value Ref Range    Sodium 142 135 - 145 MMOL/L    Potassium 4.6 3.5 - 5.1 MMOL/L    Chloride 106 99 - 110 mMol/L    CO2 25 21 - 32 MMOL/L    BUN 29 (H) 6 - 23 MG/DL    CREATININE 0.8 0.6 - 1.1 MG/DL    Glucose 129 (H) 70 - 99 MG/DL    Calcium 7.9 (L) 8.3 - 10.6 MG/DL    Albumin 3.6 3.4 - 5.0 GM/DL    Total Protein 6.4 6.4 - 8.2 GM/DL    Total Bilirubin 0.2 0.0 - 1.0 MG/DL    ALT 27 10 - 40 U/L    AST 29 15 - 37 IU/L    Alkaline Phosphatase 86 40 - 129 IU/L    GFR Non-African American >60 >60 mL/min/1.73m2    GFR African American >60 >60 mL/min/1.73m2    Anion Gap 11 4 - 16   CBC with Auto Differential    Collection Time: 05/19/22  5:50 AM   Result Value Ref Range WBC 6.5 4.0 - 10.5 K/CU MM    RBC 4.45 4.2 - 5.4 M/CU MM    Hemoglobin 13.5 12.5 - 16.0 GM/DL    Hematocrit 45.2 37 - 47 %    .6 (H) 78 - 100 FL    MCH 30.3 27 - 31 PG    MCHC 29.9 (L) 32.0 - 36.0 %    RDW 14.0 11.7 - 14.9 %    Platelets 331 794 - 201 K/CU MM    MPV 9.1 7.5 - 11.1 FL    Differential Type AUTOMATED DIFFERENTIAL     Segs Relative 89.7 (H) 36 - 66 %    Lymphocytes % 8.7 (L) 24 - 44 %    Monocytes % 1.1 0 - 4 %    Eosinophils % 0.0 0 - 3 %    Basophils % 0.2 0 - 1 %    Segs Absolute 5.9 K/CU MM    Lymphocytes Absolute 0.6 K/CU MM    Monocytes Absolute 0.1 K/CU MM    Eosinophils Absolute 0.0 K/CU MM    Basophils Absolute 0.0 K/CU MM    Immature Neutrophil % 0.3 0 - 0.43 %    Total Immature Neutrophil 0.02 K/CU MM        Imaging/Diagnostics Last 24 Hours   XR CHEST PORTABLE    Result Date: 5/18/2022  EXAMINATION: ONE XRAY VIEW OF THE CHEST 5/18/2022 9:08 pm COMPARISON: 01/06/2017 HISTORY: ORDERING SYSTEM PROVIDED HISTORY: chest pain TECHNOLOGIST PROVIDED HISTORY: Reason for exam:->chest pain Reason for Exam: cough, sob, chest pain FINDINGS: The lungs are clear. Pulmonary vascularity is normal.  The cardiomediastinal silhouette is normal.     No acute abnormality. CTA PULMONARY W CONTRAST    Result Date: 5/19/2022  EXAMINATION: CTA OF THE CHEST 5/19/2022 1:12 am TECHNIQUE: CTA of the chest was performed after the administration of intravenous contrast.  Multiplanar reformatted images are provided for review. MIP images are provided for review. Automated exposure control, iterative reconstruction, and/or weight based adjustment of the mA/kV was utilized to reduce the radiation dose to as low as reasonably achievable. COMPARISON: None. HISTORY: ORDERING SYSTEM PROVIDED HISTORY: SOB, elevated D-dimer TECHNOLOGIST PROVIDED HISTORY: Reason for exam:->SOB, elevated D-dimer Reason for Exam: chest pain, sob FINDINGS: Pulmonary Arteries: Pulmonary arteries are adequately opacified for evaluation.   No evidence of intraluminal filling defect to suggest pulmonary embolism. Main pulmonary artery is normal in caliber. Mediastinum: No thoracic aortic aneurysm. No aortic dissection. Atherosclerotic plaque is seen, with mild great vessel narrowing involving the left subclavian artery. Coronary artery atherosclerosis. No significant cardiomegaly. Lungs/pleura: Severe emphysematous changes are identified. No spiculated lung mass. No focal consolidation or pleural effusion. Mild central bronchial thickening. Upper Abdomen: Atherosclerotic disease seen in the upper abdominal vasculature. No acute process is identified. Soft Tissues/Bones: Healed remote rib fractures are noted. No acute osseous fracture is identified. No osseous destructive process. Bronchial thickening which may represent an acute exacerbation with severe underlying emphysema. No focal infiltrate. No pulmonary embolism. Diffuse atherosclerotic disease including coronary artery involvement.        Electronically signed by Earl Manjarrez PA-C on 5/19/2022 at 1:24 PM

## 2022-05-19 NOTE — H&P
History and Physical      Name:  Alisson Felder /Age/Sex: 1935  (80 y.o. female)   MRN & CSN:  [id number] & [id number] Admission Date/Time: 2022  8:53 PM   Location:   PCP: Sofya Johnson MD       Hospital Day: 1    Assessment and Plan:   Alisson Felder is a 80 y.o.  female  who presents with COPD exacerbation ([facility] [address]    # Acute Hypoxic respiratory failure    May be related to patient COPD exacerbation    Oxygen saturation of 80% on room air   shortness of breath and cough ,  diaphoresis   Respiratory panel PCR, D-dimer  Result for d-dimer and CTA pulmonary W contrast later obtained   D-Dimer, [id number] High NG/mL(DDU   Impression:  Bronchial thickening which may represent an acute exacerbation with severe   underlying emphysema. No focal infiltrate. No pulmonary embolism. Diffuse atherosclerotic disease including coronary artery involvement. currently on 5L oxygen,  For 97% saturation wean as tolerated    #  SIRS    Criteria RR 30, /min    Positive for parainfluenza  3 virus    Continue oxygen supplement. # COPD with acute exacerbation    Shortness of breath, cough, wheezing    chest X-ray The lungs are clear. Pulmonary vascularity is normal.  The cardiomediastinal   silhouette is normal. ,Impression:  No acute abnormality. Oxygen supplementation   Breathing treatment, albuterol/Dueneb.  And steriod     # Dementia   patient on home Aricept   10 mg daily         Diet No diet orders on file   DVT Prophylaxis [x] Lovenox, []  Heparin, [] SCDs, [] Ambulation   GI Prophylaxis [x] PPI,  [] H2 Blocker,  [] Carafate,  [] Diet/Tube Feeds   Code Status Full   Disposition Patient requires continued admission due to  Shortness of breath   MDM [] Low, [] Moderate,[x]  High  Patient's risk as above due to  Continued oxygen requirement      History of Present Illness:     Chief Complaint: COPD exacerbation ([facility] [address]     Alisson Felder is a 80 y.o.  female  With PMH that include dementia, COPD hypertension and osteoporosis. Patient is a former smoker who quit on 03/10/2003. She  presents with  Chief complaint of shortness of breath and cough. Information was obtained from patient and daughter. Patient does not use oxygen at home but has breathing treatment with nebulizer as needed daily. She started experiencing shortness of breath two days ago. Got worse today. Was taken to Urgent care by daughter when her oxygen saturation was observed to be 80% on room air. they were directed to come to ER. Patient oxygen saturation improved with supplemental oxygen . She denies fever, chest and abdominal pain. She is been admitted for further evaluation and management of her hypoxia and shortness of breath. Patient presentation and plan of care discussed with telemedicine attending Dr. Brandyn Jama. Ten point ROS reviewed negative, unless as noted in HPI  above    Objective:   No intake or output data in the 24 hours ending 05/18/22 2327   Vitals:   Vitals:    05/18/22 2247   BP: (!) 165/76   Pulse: 107   Resp: 30   Temp:    SpO2: 97%     Physical Exam:     GEN Awake female,laying in bed in apparent distress. Ill appearing  Appears given age. EYES Pupils are equally round. No scleral erythema, discharge, or conjunctivitis. HENT Mucous membranes are moist. Oral pharynx without exudates, no evidence of thrush. NECK Supple, no apparent thyromegaly or masses. RESP  tachypnea, using accessory muscle, diminished air entry ,  wheezes,  And rhonchi. Appreciated  Symmetric chest movement while on 5L of oxygen   CARDIO/VASC S1/S2 auscultated. Tachycardic  rate without appreciable murmurs, rubs, or gallops. No JVD or carotid bruits. Peripheral pulses equal bilaterally and palpable. No peripheral edema. GI Abdomen is soft  Non distended without significant tenderness, masses, or guarding. Bowel sounds are normoactive. Rectal exam deferred.  No costovertebral angle tenderness.  Normal appearing external genitalia. Castillo catheter is not present. HEME/LYMPH No palpable cervical lymphadenopathy and no hepatosplenomegaly. No petechiae or ecchymoses. MSK No gross joint deformities. SKIN Normal coloration, warm, dry. NEURO Cranial nerves appear grossly intact, normal speech, no lateralizing weakness. PSYCH Awake, alert, oriented x 4. Affect appropriate. Past Medical History:      Past Medical History:   Diagnosis Date    COPD (chronic obstructive pulmonary disease) ([facility] [address]     Hypertension     Osteoporosis      PSHX:  has a past surgical history that includes Hysterectomy; Abdomen surgery; Tonsillectomy; and Abdominal aortic aneurysm repair. Allergies: No Known Allergies    FAM HX: family history includes Cancer in her father; Osteoporosis in her mother. Soc HX:   Social History     Socioeconomic History    Marital status:      Spouse name: None    Number of children: None    Years of education: None    Highest education level: None   Occupational History    None   Tobacco Use    Smoking status: Former Smoker     Quit date: 3/10/2003     Years since quittin.2    Smokeless tobacco: Never Used   Substance and Sexual Activity    Alcohol use: No    Drug use: No    Sexual activity: None   Other Topics Concern    None   Social History Narrative    None     Social Determinants of Health     Financial Resource Strain:     Difficulty of Paying Living Expenses: Not on file   Food Insecurity:     Worried About Running Out of Food in the Last Year: Not on file    Cassi of Food in the Last Year: Not on file   Transportation Needs:     Lack of Transportation (Medical): Not on file    Lack of Transportation (Non-Medical):  Not on file   Physical Activity:     Days of Exercise per Week: Not on file    Minutes of Exercise per Session: Not on file   Stress:     Feeling of Stress : Not on file   Social Connections:     Frequency of Communication with Friends and Family: Not on file    Frequency of Social Gatherings with Friends and Family: Not on file    Attends Spiritism Services: Not on file    Active Member of Clubs or Organizations: Not on file    Attends Club or Organization Meetings: Not on file    Marital Status: Not on file   Intimate Partner Violence:     Fear of Current or Ex-Partner: Not on file    Emotionally Abused: Not on file    Physically Abused: Not on file    Sexually Abused: Not on file   Housing Stability:     Unable to Pay for Housing in the Last Year: Not on file    Number of [place] in the Last Year: Not on file    Unstable Housing in the Last Year: Not on file       Medications:   Medications:    Infusions:   PRN Meds:       Electronically signed by JACKIE Mcknight CNP on 5/18/2022 at 11:27 PM

## 2022-05-19 NOTE — PROGRESS NOTES
Nataliya CERVANTES notified that that Heparin sq is ordered and no APTT is ordered. Nataliya CERVANTES notified and ordered to hold Heparin and wait until CT results are back . [facility] lab notified about STAT APTT, placed by this RN.      Nereyda Ireland RN  1:52 AM

## 2022-05-19 NOTE — PROGRESS NOTES
Wayne NP stated patient's D-dimer is elevated, n.o. CTA stat. Patient can go to x-ray without telemetry/cardic monitoring. Patient was updated on CTA, voiced understanding. Patient was updated that she can go without telemetry/cardic monitoring, education given for the risk of not being on telemetry. Patient voiced understanding and stated she was \"ok with that. \"

## 2022-05-19 NOTE — ED NOTES
Pt transferred to Inpatient Rm 3. Report given to Kenny Fair at bedside.       Alina Dumont RN  05/19/22 1986

## 2022-05-19 NOTE — ED PROVIDER NOTES
The history is provided by the patient. Shortness of Breath  Severity:  Severe  Onset quality:  Gradual  Duration:  2 days  Timing:  Constant  Progression:  Worsening  Chronicity:  New  Context: activity and weather changes    Context: not smoke exposure and not URI    Relieved by:  Nothing  Worsened by:  Deep breathing, activity, movement, exertion, stress and weather changes  Ineffective treatments:  Rest, inhaler, sitting up and lying down  Associated symptoms: cough, diaphoresis and wheezing    Associated symptoms: no abdominal pain, no chest pain, no fever, no hemoptysis, no sore throat, no sputum production and no syncope    Cough:     Cough characteristics:  Non-productive  Wheezing:     Severity:  Severe    Timing:  Constant    Progression:  Worsening    Chronicity:  Recurrent      Review of Systems   Constitutional: Positive for diaphoresis. Negative for fever. HENT: Negative. Negative for sore throat. Eyes: Negative. Respiratory: Positive for cough, shortness of breath and wheezing. Negative for hemoptysis and sputum production. Cardiovascular: Negative. Negative for chest pain and syncope. Gastrointestinal: Negative. Negative for abdominal pain. Genitourinary: Negative. Musculoskeletal: Negative. Skin: Negative. Neurological: Negative. All other systems reviewed and are negative.       Family History   Problem Relation Age of Onset    Osteoporosis Mother     Cancer Father      Social History     Socioeconomic History    Marital status:      Spouse name: Not on file    Number of children: Not on file    Years of education: Not on file    Highest education level: Not on file   Occupational History    Not on file   Tobacco Use    Smoking status: Former Smoker     Quit date: 3/10/2003     Years since quittin.2    Smokeless tobacco: Never Used   Substance and Sexual Activity    Alcohol use: No    Drug use: No    Sexual activity: Not on file   Other Topics Concern    Not on file   Social History Narrative    Not on file     Social Determinants of Health     Financial Resource Strain:     Difficulty of Paying Living Expenses: Not on file   Food Insecurity:     Worried About Running Out of Food in the Last Year: Not on file    Cassi of Food in the Last Year: Not on file   Transportation Needs:     Lack of Transportation (Medical): Not on file    Lack of Transportation (Non-Medical): Not on file   Physical Activity:     Days of Exercise per Week: Not on file    Minutes of Exercise per Session: Not on file   Stress:     Feeling of Stress : Not on file   Social Connections:     Frequency of Communication with Friends and Family: Not on file    Frequency of Social Gatherings with Friends and Family: Not on file    Attends Gnosticist Services: Not on file    Active Member of [address] [place] [address] or Organizations: Not on file    Attends Club or Organization Meetings: Not on file    Marital Status: Not on file   Intimate Partner Violence:     Fear of Current or Ex-Partner: Not on file    Emotionally Abused: Not on file    Physically Abused: Not on file    Sexually Abused: Not on file   Housing Stability:     Unable to Pay for Housing in the Last Year: Not on file    Number of [place] in the Last Year: Not on file    Unstable Housing in the Last Year: Not on file     Past Surgical History:   Procedure Laterality Date    ABDOMEN SURGERY      Gastric Ulcer perforation of stomach    ABDOMINAL AORTIC ANEURYSM REPAIR      HYSTERECTOMY      TONSILLECTOMY       Past Medical History:   Diagnosis Date    COPD [facility]chronic obstructive [facility] disease) ([facility] [address]     Hypertension     Osteoporosis      No Known Allergies  Prior to Admission medications    Medication Sig Start Date End Date Taking?  Authorizing Provider   donepezil (ARICEPT) 10 MG tablet Take 10 mg by mouth nightly   Yes Historical Provider, MD   mirtazapine (REMERON) 15 MG tablet Take 15 mg by mouth nightly   Yes Historical Provider, MD   predniSONE (DELTASONE) 10 MG tablet Take 10 mg by mouth daily Not sure if still taking   Yes Historical Provider, MD   levocetirizine (XYZAL) 5 MG tablet Take 5 mg by mouth nightly   Yes Historical Provider, MD   budesonide-formoterol (SYMBICORT) 160-4.5 MCG/ACT AERO Inhale 2 puffs into the lungs 2 times daily   Yes Historical Provider, MD   Multiple Vitamins-Minerals (THERAPEUTIC MULTIVITAMIN-MINERALS) tablet Take 1 tablet by mouth daily   Yes Historical Provider, MD   ipratropium-albuterol (DUONEB) 0.5-2.5 (3) MG/3ML SOLN nebulizer solution Inhale 1 vial into the lungs 4 times daily    Historical Provider, MD   vitamin D3 (CHOLECALCIFEROL) 400 UNITS TABS tablet Take 400 Units by mouth daily    Historical Provider, MD   aspirin 325 MG tablet Take 325 mg by mouth daily    Historical Provider, MD       BP (!) 165/76   Pulse 107   Temp 98.2 °F (36.8 °C) (Oral)   Resp 30   Ht 4' 10\" (1.473 m)   Wt 82 lb (37.2 kg)   SpO2 97%   BMI 17.14 kg/m²     Physical Exam  Vitals and nursing note reviewed. Constitutional:       General: She is in acute distress. Appearance: She is ill-appearing and toxic-appearing. HENT:      Head: Normocephalic. Eyes:      Pupils: Pupils are equal, round, and reactive to light. Cardiovascular:      Rate and Rhythm: Tachycardia present. Pulmonary:      Effort: Tachypnea, accessory muscle usage and respiratory distress present. Breath sounds: Decreased breath sounds and wheezing present. Abdominal:      Palpations: Abdomen is soft. Musculoskeletal:      Cervical back: Normal range of motion. Right lower leg: No tenderness. Left lower leg: No tenderness. Skin:     Capillary Refill: Capillary refill takes 2 to 3 seconds. Coloration: Skin is pale. Neurological:      General: No focal deficit present.          MDM:    Labs Reviewed   CBC WITH AUTO DIFFERENTIAL - Abnormal; Notable for the following components: Result Value    Hematocrit 48.8 (*)     MCHC 30.7 (*)     Segs Relative 87.6 (*)     Lymphocytes % 6.7 (*)     Monocytes % 5.1 (*)     All other components within normal limits   BASIC METABOLIC PANEL - Abnormal; Notable for the following components:    BUN 31 (*)     Glucose 191 (*)     GFR Non- 59 (*)     All other components within normal limits   BRAIN NATRIURETIC PEPTIDE - Abnormal; Notable for the following components:    Pro-BNP 1,813 (*)     All other components within normal limits   RESPIRATORY PANEL, MOLECULAR, WITH COVID-19   TROPONIN   D-DIMER, QUANTITATIVE       XR CHEST PORTABLE   Final Result   No acute abnormality. Saline lock was established. Patient was given steroids in the emergency department. Patient was also given duo nebulizer treatments. Patient's arrival O2 saturation was 76%. Patient does not utilize home oxygen but she does have a home nebulizer. Patient was placed on 5 L nasal cannula and her oxygen saturations begin to increase. The patient's oxygen saturations at the time of disposition was 97%. Patient also had significant decrease in her tachycardia. The patient was tachycardic at 130 235 bpm.  This was a sinus tachycardia. At the time of her disposition the patient's pulse was only 99. The patient will be admitted to the hospital.  I have contacted the hospitalist at [facility] for admission. The patient appears to have hypoxia secondary to an acute COPD exacerbation chest x-ray does not show any evidence of acute abnormality have no clinical suspicion for that of pneumonia at this time    Critical care time of 30 minutes was given to this patient which included time at the bedside, discussions with consultants, review of the chart, and lab studies. This critical care time does not include any billable procedures. Final Impression    1. Acute exacerbation of chronic obstructive pulmonary disease (COPD) ([facility] [address]    2. Hypoxia    3.  COPD exacerbation ([facility]              [place] January, DO 05/18/22 2252

## 2022-05-19 NOTE — PROGRESS NOTES
LOVENOX PROPHYLAXIS EVALUATION    Wt Readings from Last 3 Encounters:   05/18/22 82 lb (37.2 kg)   01/06/17 80 lb (36.3 kg)   09/29/16 79 lb (35.8 kg)       Estimated Creatinine Clearance: 30 mL/min (based on SCr of 0.8 mg/dL). Recent Labs     05/18/22  2116 05/18/22  2116 05/19/22  0001 05/19/22  0550   BUN 31*  --   --  29*   CREATININE 0.9   < >  --  0.8      < >  --  192   HGB 15.0   < >  --  13.5   HCT 48.8*   < >  --  45.2   INR  --   --  1.02  --     < > = values in this interval not displayed. Weight Range (kg): 50.9 kg and below    CRCL = 15-29.99     50.9 kg   and below     .9  kg   101-150.9 kg   151-174.9  kg   175 kg  or greater     Heparin 5,000 units  subq BID     30mg subq daily       30mg subq  daily   40mg subq  daily   60mg subq daily       Per P/T protocol for appropriate subq anticoagulation by weight and CRCL change to:  Enoxparin 30mg subq daily. Per protocol patient is to receive heparin 5,000 units BID, KIM Krueger dc'ed the heparin and ordered Lovenox 40mg. I changed to 30mg due to CrCl of 30 and weight of 37.2.      Lucila Flowers [facility]  10:17 AM  05/19/22

## 2022-05-20 VITALS
RESPIRATION RATE: 33 BRPM | DIASTOLIC BLOOD PRESSURE: 98 MMHG | TEMPERATURE: 97.6 F | HEIGHT: 58 IN | BODY MASS INDEX: 17.21 KG/M2 | HEART RATE: 88 BPM | OXYGEN SATURATION: 97 % | WEIGHT: 82 LBS | SYSTOLIC BLOOD PRESSURE: 148 MMHG

## 2022-05-20 LAB
ALBUMIN SERPL-MCNC: 3.4 GM/DL (ref 3.4–5)
ALP BLD-CCNC: 79 IU/L (ref 40–129)
ALT SERPL-CCNC: 16 U/L (ref 10–40)
ANION GAP SERPL CALCULATED.3IONS-SCNC: 8 MMOL/L (ref 4–16)
AST SERPL-CCNC: 36 IU/L (ref 15–37)
BASOPHILS ABSOLUTE: 0 K/CU MM
BASOPHILS RELATIVE PERCENT: 0.1 % (ref 0–1)
BILIRUB SERPL-MCNC: 0.2 MG/DL (ref 0–1)
BUN BLDV-MCNC: 31 MG/DL (ref 6–23)
CALCIUM SERPL-MCNC: 8.2 MG/DL (ref 8.3–10.6)
CHLORIDE BLD-SCNC: 106 MMOL/L (ref 99–110)
CO2: 29 MMOL/L (ref 21–32)
CREAT SERPL-MCNC: 0.7 MG/DL (ref 0.6–1.1)
DIFFERENTIAL TYPE: ABNORMAL
EOSINOPHILS ABSOLUTE: 0 K/CU MM
EOSINOPHILS RELATIVE PERCENT: 0.1 % (ref 0–3)
GFR AFRICAN AMERICAN: >60 ML/MIN/1.73M2
GFR NON-AFRICAN AMERICAN: >60 ML/MIN/1.73M2
GLUCOSE BLD-MCNC: 92 MG/DL (ref 70–99)
HCT VFR BLD CALC: 43.6 % (ref 37–47)
HEMOGLOBIN: 13.3 GM/DL (ref 12.5–16)
IMMATURE NEUTROPHIL %: 0.4 % (ref 0–0.43)
LYMPHOCYTES ABSOLUTE: 1.7 K/CU MM
LYMPHOCYTES RELATIVE PERCENT: 17.1 % (ref 24–44)
MCH RBC QN AUTO: 30.4 PG (ref 27–31)
MCHC RBC AUTO-ENTMCNC: 30.5 % (ref 32–36)
MCV RBC AUTO: 99.8 FL (ref 78–100)
MONOCYTES ABSOLUTE: 0.8 K/CU MM
MONOCYTES RELATIVE PERCENT: 8.3 % (ref 0–4)
PDW BLD-RTO: 13.9 % (ref 11.7–14.9)
PLATELET # BLD: 207 K/CU MM (ref 140–440)
PMV BLD AUTO: 9 FL (ref 7.5–11.1)
POTASSIUM SERPL-SCNC: 3.9 MMOL/L (ref 3.5–5.1)
RBC # BLD: 4.37 M/CU MM (ref 4.2–5.4)
SEGMENTED NEUTROPHILS ABSOLUTE COUNT: 7.2 K/CU MM
SEGMENTED NEUTROPHILS RELATIVE PERCENT: 74 % (ref 36–66)
SODIUM BLD-SCNC: 143 MMOL/L (ref 135–145)
TOTAL IMMATURE NEUTOROPHIL: 0.04 K/CU MM
TOTAL PROTEIN: 6.2 GM/DL (ref 6.4–8.2)
WBC # BLD: 9.8 K/CU MM (ref 4–10.5)

## 2022-05-20 PROCEDURE — 6370000000 HC RX 637 (ALT 250 FOR IP): Performed by: NURSE PRACTITIONER

## 2022-05-20 PROCEDURE — 85025 COMPLETE CBC W/AUTO DIFF WBC: CPT

## 2022-05-20 PROCEDURE — 36415 COLL VENOUS BLD VENIPUNCTURE: CPT

## 2022-05-20 PROCEDURE — 80053 COMPREHEN METABOLIC PANEL: CPT

## 2022-05-20 PROCEDURE — 94761 N-INVAS EAR/PLS OXIMETRY MLT: CPT

## 2022-05-20 PROCEDURE — 2700000000 HC OXYGEN THERAPY PER DAY

## 2022-05-20 PROCEDURE — 94640 AIRWAY INHALATION TREATMENT: CPT

## 2022-05-20 PROCEDURE — 6370000000 HC RX 637 (ALT 250 FOR IP): Performed by: FAMILY MEDICINE

## 2022-05-20 PROCEDURE — 6360000002 HC RX W HCPCS: Performed by: NURSE PRACTITIONER

## 2022-05-20 RX ORDER — ENOXAPARIN SODIUM 100 MG/ML
30 INJECTION SUBCUTANEOUS NIGHTLY
Status: DISCONTINUED | OUTPATIENT
Start: 2022-05-20 | End: 2022-05-20 | Stop reason: HOSPADM

## 2022-05-20 RX ORDER — PREDNISONE 20 MG/1
40 TABLET ORAL DAILY
Qty: 8 TABLET | Refills: 0 | Status: SHIPPED | OUTPATIENT
Start: 2022-05-21 | End: 2022-05-25

## 2022-05-20 RX ADMIN — ASPIRIN 325 MG: 325 TABLET ORAL at 08:52

## 2022-05-20 RX ADMIN — CETIRIZINE HYDROCHLORIDE 5 MG: 5 TABLET ORAL at 08:52

## 2022-05-20 RX ADMIN — MOMETASONE FUROATE AND FORMOTEROL FUMARATE DIHYDRATE 2 PUFF: 200; 5 AEROSOL RESPIRATORY (INHALATION) at 07:20

## 2022-05-20 RX ADMIN — CHOLECALCIFEROL (VITAMIN D3) 10 MCG (400 UNIT) TABLET 400 UNITS: at 08:52

## 2022-05-20 RX ADMIN — PREDNISONE 40 MG: 20 TABLET ORAL at 08:52

## 2022-05-20 RX ADMIN — Medication 1 TABLET: at 08:52

## 2022-05-20 RX ADMIN — ALBUTEROL SULFATE 2.5 MG: 2.5 SOLUTION RESPIRATORY (INHALATION) at 07:20

## 2022-05-20 ASSESSMENT — PAIN SCALES - GENERAL
PAINLEVEL_OUTOF10: 0

## 2022-05-20 NOTE — PLAN OF CARE
Problem: Discharge Planning  Goal: Discharge to home or other facility with appropriate resources  5/19/2022 2259 by Darek Ceron RN  Outcome: Progressing  5/19/2022 1847 by Lety Jimenez RN  Outcome: Progressing     Problem: Pain  Goal: Verbalizes/displays adequate comfort level or baseline comfort level  5/19/2022 2259 by Darek Ceron RN  Outcome: Progressing  5/19/2022 1847 by Lety Jimenez RN  Outcome: Progressing  Flowsheets (Taken 5/19/2022 1847)  Verbalizes/displays adequate comfort level or baseline comfort level:   Encourage patient to monitor pain and request assistance   Assess pain using appropriate pain scale   Implement non-pharmacological measures as appropriate and evaluate response     Problem: Safety - Adult  Goal: Free from fall injury  5/19/2022 2259 by Darek Ceron RN  Outcome: Progressing  5/19/2022 1847 by Lety Jimenez RN  Outcome: Progressing     Problem: Respiratory - Adult  Goal: Achieves optimal ventilation and oxygenation  5/19/2022 2259 by Darek Ceron RN  Outcome: Progressing  5/19/2022 1847 by Lety Jimenez RN  Outcome: Progressing  Flowsheets (Taken 5/19/2022 1847)  Achieves optimal ventilation and oxygenation:   Assess for changes in respiratory status   Oxygen supplementation based on oxygen saturation or arterial blood gases     Problem: Skin/Tissue Integrity - Adult  Goal: Skin integrity remains intact  5/19/2022 2259 by Darek Ceron RN  Outcome: Progressing  5/19/2022 1847 by Lety Jimenez RN  Outcome: Progressing  Flowsheets (Taken 5/19/2022 1845)  Skin Integrity Remains Intact: Monitor for areas of redness and/or skin breakdown  Goal: Incisions, wounds, or drain sites healing without S/S of infection  5/19/2022 2259 by Darek Ceron RN  Outcome: Progressing  5/19/2022 1847 by Lety Jimenez RN  Outcome: Progressing     Problem: Skin/Tissue Integrity  Goal: Absence of new skin breakdown  Description: 1.   Monitor for areas of redness and/or skin breakdown  2. Assess vascular access sites hourly  3. Every 4-6 hours minimum:  Change oxygen saturation probe site  4. Every 4-6 hours:  If on nasal continuous positive airway pressure, respiratory therapy assess nares and determine need for appliance change or resting period.   5/19/2022 2259 by Mike Paula RN  Outcome: Progressing  5/19/2022 1847 by Olean Dakin, RN  Outcome: Progressing     Problem: Nutrition Deficit:  Goal: Optimize nutritional status  5/19/2022 2259 by Mike Paula RN  Outcome: Progressing  5/19/2022 1847 by Olean Dakin, RN  Outcome: Progressing     Problem: Chronic Conditions and Co-morbidities  Goal: Patient's chronic conditions and co-morbidity symptoms are monitored and maintained or improved  5/19/2022 2259 by Mike Paula RN  Outcome: Progressing  5/19/2022 1847 by Olean Dakin, RN  Outcome: Progressing     Problem: ABCDS Injury Assessment  Goal: Absence of physical injury  5/19/2022 2259 by Mike Paula RN  Outcome: Progressing  5/19/2022 1847 by Olean Dakin, RN  Outcome: Progressing  Flowsheets (Taken 5/19/2022 0800)  Absence of Physical Injury: Implement safety measures based on patient assessment

## 2022-05-20 NOTE — PROGRESS NOTES
Comprehensive Nutrition Assessment    Type and Reason for Visit:  Initial (low BMI)    Nutrition Recommendations/Plan:   1. Continue current diet   2. Add standard oral nutrition supplements TID  3. Please obtain measured weight so that nutrition status can be accurately assessed  4. Please document all po intake     Malnutrition Assessment:  Malnutrition Status:  Insufficient data (05/20/22 5298)    Context:  Chronic Illness       Nutrition Assessment:    Pt admitted for COPD exacerbation, hypoxia, PMH: Osteoporosis, HTN, COPD, dementia, Pt currently on a dysphagia-minced and moist diet, no po intake documented since admission per flowsheet, no comprehensive weight hx available for review, no wounds documented, pt currently on 1 L NC, underweight, will follow at high nutrition risk    Nutrition Related Findings:    Meds: Vitamin D, MV, Remeron, Deltasone Wound Type: None       Current Nutrition Intake & Therapies:    Average Meal Intake: Unable to assess  Average Supplements Intake: None Ordered  ADULT DIET; Dysphagia - Minced and Moist    Anthropometric Measures:  Height: 4' 9.99\" (147.3 cm)  Ideal Body Weight (IBW): 90 lbs (41 kg)       Current Body Weight: 82 lb 0.2 oz (37.2 kg), 91.1 % IBW.  Weight Source: Bed Scale  Current BMI (kg/m2): 17.1  Weight Adjustment For: No Adjustment  BMI Categories: Underweight (BMI less than 22) age over 72    Estimated Daily Nutrient Needs:  Energy Requirements Based On: Kcal/kg  Weight Used for Energy Requirements: Current  Energy (kcal/day): 0567-1102 (33-35 kcal/kg)  Weight Used for Protein Requirements: Current  Protein (g/day): 50-60 (1.4-1.6 g/kg)    Nutrition Diagnosis:   · Underweight related to inadequate protein-energy intake as evidenced by BMI    Nutrition Interventions:   Food and/or Nutrient Delivery: Continue Current Diet,Start Oral Nutrition Supplement  Nutrition Education/Counseling: No recommendation at this time  Coordination of Nutrition Care: Continue to monitor while inpatient     Goals:     Goals: PO intake 50% or greater,by next RD assessment     Nutrition Monitoring and Evaluation:   Behavioral-Environmental Outcomes: None Identified  Food/Nutrient Intake Outcomes: Food and Nutrient Intake,Supplement Intake  Physical Signs/Symptoms Outcomes: Biochemical Data,GI Status,Meal Time Behavior,Hemodynamic Status,Weight,Skin    Discharge Planning:     Too soon to determine     Jesse Zheng [address], [address] [place] [address],   Contact: [phone]

## 2022-05-20 NOTE — PROGRESS NOTES
DOSE ADJUSTMENT MADE PER P/T PROTOCOL    PREVIOUS ORDER:  Lovenox 40mg SQ daily    Patient weight:   Wt Readings from Last 1 Encounters:   05/18/22 82 lb (37.2 kg)       Estimated Creatinine Clearance: 30 mL/min (based on SCr of 0.8 mg/dL). Recent Labs     05/18/22 2116 05/18/22 2116 05/19/22  0001 05/19/22  0550   BUN 31*  --   --  29*   CREATININE 0.9   < >  --  0.8      < >  --  192   INR  --   --  1.02  --     < > = values in this interval not displayed. TABLE 1.   ENOXAPARIN ROUTINE PROPHYLAXIS DOSING (Medically ill, routine surgery)   Patient Weight (kg)     50.9 and below 51 - 100.9 101 - 150.9 151 - 174.9 175 or greater         Estimated CrCl  (ml/min) 30 or greater   30 mg SUBQ daily   40 mg SUBQ daily 30 mg SUBQ BID  40 mg SUBQ BID 60mg SUBQ BID      15-29 UFH 5000 units SUBQ BID   30 mg SUBQ daily 30 mg SUBQ daily 40 mg SUBQ daily   60 mg SUBQ daily      Less than 15 or Dialysis UFH 5000 units SUBQ BID   UFH 5000 units SUBQ TID UFH 7500 units SUBQ TID     NEW ADJUSTED ORDER:  Lovenox 30mg SQ daily    KIMBERLEE Valverde [facility]  5/20/2022 7:08 AM

## 2022-05-20 NOTE — DISCHARGE SUMMARY
V2.0  Discharge Summary    Name:  Tata Alcantar /Age/Sex: 1935 (69 y.o. female)   Admit Date: 2022  Discharge Date: 22    MRN & CSN:  [id number] & [id number] Encounter Date and Time 22 11:28 AM EDT    Attending:  Justyna Aguillon MD Discharging Provider: Sosa Espinoza [facility] Course:     Brief HPI: Tata Alcantar is a 80 y.o. female who presented with acute COPD exacerbation on May 18, 2022. The patient symptoms began approximately 48 hours before ED admission. The patient was tachycardic, tachypneic and mildly hypoxic. Brief Problem Based Course:   1. Acute COPD exacerbation secondary to parainfluenza 3. This patient presenting on May 18, 2022 at onset symptoms May 16. The patient being discharged May 20, 2022. Patient had been treated with oxygen, steroids and nebulized medication. She has steadily improved. She is back to baseline. On room air she desaturates to 88%. Ambulatory oximetry not assessed due to hypoxemia at rest in the bed. Patient will require oxygen. Order signed. The patient was initially on Solu-Medrol and agrees to oral prednisone 40 mg. She will be discharged with prednisone 40 mg daily for the next 4 days and then transition back to her usual 10 mg daily. This prescription sent to [address] [place] [address] in [place]. Patient will also use her home nebulized medication and inhalers as prescribed by Dr. Eddie Ramirez pulmonary physician in [place]. The patient will contact his office for a follow-up visit next week. 2. Elevated D-dimer. The patient noted to have elevated D-dimer in excess of 1000. Chest CT pulmonary embolism study negative for PE. COPD changes noted. No pneumonia. 3. Coronary artery disease as seen on CT scan. The patient expressed appropriate understanding of, and agreement with the discharge recommendations, medications, and plan.      Consults this admission:  None    Discharge Diagnosis:   COPD exacerbation ([facility] [address]  Parainfluenza 3  Hypoxemia requiring oxygen      Discharge Instruction:   Follow up appointments: To be scheduled with pulmonary medicine within 1 week  Primary care physician: Gela Chavez MD within 2 weeks  Diet: regular diet   Activity: activity as tolerated  Disposition: Discharged to:   []Home, []C, []SNF, []Acute Rehab, []Hospice   Condition on discharge: Stable  Labs and Tests to be Followed up as an outpatient by PCP or Specialist    Discharge Medications:        Medication List      CHANGE how you take these medications    * predniSONE 10 MG tablet  Commonly known as: Locke Angle  What changed: Another medication with the same name was added. Make sure you understand how and when to take each. * predniSONE 20 MG tablet  Commonly known as: DELTASONE  Take 2 tablets by mouth daily for 4 doses  Start taking on: May 21, 2022  What changed: You were already taking a medication with the same name, and this prescription was added. Make sure you understand how and when to take each. * This list has 2 medication(s) that are the same as other medications prescribed for you. Read the directions carefully, and ask your doctor or other care provider to review them with you.             CONTINUE taking these medications    aspirin 325 MG tablet     donepezil 10 MG tablet  Commonly known as: ARICEPT     ipratropium-albuterol 0.5-2.5 (3) MG/3ML Soln nebulizer solution  Commonly known as: DUONEB     levocetirizine 5 MG tablet  Commonly known as: XYZAL     mirtazapine 15 MG tablet  Commonly known as: REMERON     Symbicort 160-4.5 MCG/ACT Aero  Generic drug: budesonide-formoterol     therapeutic multivitamin-minerals tablet     vitamin D3 10 MCG (400 UNIT) Tabs tablet  Commonly known as: CHOLECALCIFEROL           Where to Get Your Medications      These medications were sent to [facility] [id number] [address] - [phone]  [facility] [address]    Phone: [phone]   · predniSONE 20 MG tablet        Objective Findings at Discharge:   BP (!) 148/98   Pulse 88   Temp 97.6 °F (36.4 °C) (Infrared)   Resp (!) 33   Ht 4' 9.99\" (1.473 m)   Wt 82 lb (37.2 kg)   SpO2 97%   BMI 17.14 kg/m²       Physical Exam:   General: NAD and conversant. Eyes: EOMI  ENT: neck supple  Cardiovascular: Regular rate. No murmurs noted. Respiratory: Patient continues mildly tachypneic, her baseline. Breath sounds diminished throughout chest which is her baseline. She is currently on oxygen 2 L nasal cannula. Oximetry 97%. Gastrointestinal: Soft, non tender  Genitourinary: no suprapubic tenderness  Musculoskeletal: No edema  Skin: warm, dry  Neuro: Alert. Psych: Mood appropriate. Labs and Imaging   Echocardiogram complete 2D with doppler with color    Result Date: 5/19/2022  Transthoracic Echocardiography Report (TTE)  Demographics   Patient Name      Jemima MCKEON    Date of Study       05/19/2022   Date of Birth     1935          Gender              Female   Age               80 year(s)          Race                   Patient Number    [id number]          Room Number         003   Visit Number      [id number]   Corporate ID      [id number]   Accession Number  [id number]          [address] [place] [address] ,                                                            RDALEXANDRO   Ordering          Henrique Canales  Physician                             Physician           MD  Procedure Type of Study   TTE procedure:ECHOCARDIOGRAM COMPLETE 2D W DOPPLER W COLOR. Procedure Date Date: 05/19/2022 Start: 11:27 AM Study Location: Portable Technical Quality: Technically difficult study Indications:Dyspnea/SOB.  Patient Status: Routine Height: 58 inches Weight: 82 pounds BSA: 1.25 m2 BMI: 17.14 kg/m2 HR: 105 bpm BP: 141/62 mmHg  Conclusions   Summary  Technically difficult examination. Left ventricular systolic function is normal.  Ejection fraction is visually estimated at 50-55%. Septal bounce noted. Mild tricuspid regurgitation; RVSP: 35 mmHg. No evidence of any pericardial effusion. Pericardial fat pad visualized. Signature   ------------------------------------------------------------------  Electronically signed by Billy Dyson MD (Interpreting  physician) on 05/19/2022 at 05:02 PM  ------------------------------------------------------------------   Findings   Left Ventricle  Left ventricular systolic function is normal.  Ejection fraction is visually estimated at 50-55%. Indeterminate diastolic function; E/A flow reversal is noted. Septal bounce noted. Left Atrium  Essentially normal left atrium. Right Atrium  Essentially normal right atrium. Right Ventricle  Essentially normal right ventricle. Aortic Valve  Individual aortic valve leaflets are not clearly visualized; no significant  valvular disease noted. Mitral Valve  Trace mitral regurgitation. Tricuspid Valve  Mild tricuspid regurgitation; RVSP: 35 mmHg. Pulmonic Valve  The pulmonic valve was not well visualized. Pericardial Effusion  No evidence of any pericardial effusion. Pleural Effusion  No evidence of pleural effusion. Miscellaneous  Pericardial fat pad visualized. Aorta was not clearly visualized.   M-Mode/2D Measurements & Calculations   LV Diastolic Dimension:  LV Systolic Dimension:  LA Dimension: 2.1 cmAO Root  3.6 cm                   2.7 cm                  Dimension: 2.8 cmLA Area:  LV FS:25 %               LV Volume Diastolic:    0.94 cm2  LV PW Diastolic: 0.8 cm  92.8 ml  LV PW Systolic: 1.4 cm   LV Volume Systolic:  Septum Diastolic: 1 cm   79.2 ml  Septum Systolic: 1.1 cm  LV EDV/LV EDV Index:    RV Diastolic Dimension: 2.1  CO: 5.54 l/min           44.1 ml/35 m2LV ESV/LV  cm  CI: 4.43 l/m*m2          ESV Index: 18.9 ml/15                           m2 LA/Aorta: 6.45  LV Area Diastolic: 63.7  EF Calculated (A4C):  cm2                      57.1 %                  LA volume/Index: 5.9 ml  LV Area Systolic: 54.4   EF Calculated (2D):     /5m2  cm2                      50.4 %                            LV Length: 7.27 cm                            LVOT: 2 cm  Doppler Measurements & Calculations   MV Peak E-Wave: 79.8 AV Peak Velocity: 151 cm/s    LVOT Peak Velocity: 120  cm/s                 AV Peak Gradient: 9.12 mmHg   cm/s  MV Peak A-Wave: 129  AV Mean Velocity: 91.1 cm/s   LVOT Mean Velocity: 66.6  cm/s                 AV Mean Gradient: 4 mmHg      cm/s  MV E/A Ratio: 0.62   AV VTI: 25.6 cm               LVOT Peak Gradient: 6  MV Peak Gradient:    AV Area (Continuity):2.06 cm2 mmHgLVOT Mean Gradient: 2  2.55 mmHg                                          mmHg                       LVOT VTI: 16.8 cm             Estimated RVSP: 35 mmHg  MV P1/2t: 63 msec                                  Estimated RAP:3 mmHg  MVA by PHT:3.49 cm2  Estimated PASP: 34.81 mmHg   MV E' Septal                                       TR Velocity:282 cm/s  Velocity: 8.61 cm/s                                TR Gradient:31.81 mmHg  MV E' Lateral  Velocity: 9.57 cm/s  MV E/E' septal: 9.27  MV E/E' lateral:  8.34      XR CHEST PORTABLE    Result Date: 5/18/2022  EXAMINATION: ONE XRAY VIEW OF THE CHEST 5/18/2022 9:08 pm COMPARISON: 01/06/2017 HISTORY: ORDERING SYSTEM PROVIDED HISTORY: chest pain TECHNOLOGIST PROVIDED HISTORY: Reason for exam:->chest pain Reason for Exam: cough, sob, chest pain FINDINGS: The lungs are clear. Pulmonary vascularity is normal.  The cardiomediastinal silhouette is normal.     No acute abnormality.      VL DUP LOWER EXTREMITY VENOUS BILATERAL    Result Date: 5/19/2022  EXAMINATION: DUPLEX VENOUS ULTRASOUND OF THE BILATERAL LOWER EXTREMITIES5/19/2022 4:02 pm TECHNIQUE: Duplex ultrasound using B-mode/gray scaled imaging, Doppler spectral analysis and color flow Doppler was obtained of the deep venous structures of the lower bilateral extremities. COMPARISON: None. HISTORY: ORDERING SYSTEM PROVIDED HISTORY: elevated D dimer TECHNOLOGIST PROVIDED HISTORY: Reason for exam:->elevated D dimer FINDINGS: The visualized veins of the bilateral lower extremities are patent and free of echogenic thrombus. The veins demonstrate good compressibility with normal color flow study and spectral analysis. No evidence of DVT in either lower extremity. CTA PULMONARY W CONTRAST    Result Date: 5/19/2022  EXAMINATION: CTA OF THE CHEST 5/19/2022 1:12 am TECHNIQUE: CTA of the chest was performed after the administration of intravenous contrast.  Multiplanar reformatted images are provided for review. MIP images are provided for review. Automated exposure control, iterative reconstruction, and/or weight based adjustment of the mA/kV was utilized to reduce the radiation dose to as low as reasonably achievable. COMPARISON: None. HISTORY: ORDERING SYSTEM PROVIDED HISTORY: SOB, elevated D-dimer TECHNOLOGIST PROVIDED HISTORY: Reason for exam:->SOB, elevated D-dimer Reason for Exam: chest pain, sob FINDINGS: Pulmonary Arteries: Pulmonary arteries are adequately opacified for evaluation. No evidence of intraluminal filling defect to suggest pulmonary embolism. Main pulmonary artery is normal in caliber. Mediastinum: No thoracic aortic aneurysm. No aortic dissection. Atherosclerotic plaque is seen, with mild great vessel narrowing involving the left subclavian artery. Coronary artery atherosclerosis. No significant cardiomegaly. Lungs/pleura: Severe emphysematous changes are identified. No spiculated lung mass. No focal consolidation or pleural effusion. Mild central bronchial thickening. Upper Abdomen: Atherosclerotic disease seen in the upper abdominal vasculature. No acute process is identified. Soft Tissues/Bones: Healed remote rib fractures are noted. No acute osseous fracture is identified. No osseous destructive process. Bronchial thickening which may represent an acute exacerbation with severe underlying emphysema. No focal infiltrate. No pulmonary embolism. Diffuse atherosclerotic disease including coronary artery involvement. CBC:   Recent Labs     05/18/22 2116 05/19/22  0550 05/20/22  0510   WBC 9.3 6.5 9.8   HGB 15.0 13.5 13.3    192 207     BMP:    Recent Labs     05/18/22 2116 05/19/22  0550 05/20/22  0510    142 143   K 4.3 4.6 3.9    106 106   CO2 24 25 29   BUN 31* 29* 31*   CREATININE 0.9 0.8 0.7   GLUCOSE 191* 129* 92     Hepatic:   Recent Labs     05/19/22  0550 05/20/22  0510   AST 29 36   ALT 27 16   BILITOT 0.2 0.2   ALKPHOS 86 79     Lipids:   Lab Results   Component Value Date    CHOL 197 01/21/2017    HDL 69 01/21/2017    TRIG 93 01/21/2017     Hemoglobin A1C: No results found for: LABA1C  TSH: No results found for: TSH  Troponin:   Lab Results   Component Value Date    TROPONINT <0.010 05/18/2022    TROPONINT <0.010 01/06/2017     Lactic Acid: No results for input(s): LACTA in the last 72 hours.   BNP:   Recent Labs     05/18/22 2116   PROBNP 1,813*     UA:  Lab Results   Component Value Date    NITRU POSITIVE 01/06/2017    COLORU YELLOW 01/06/2017    WBCUA 15 TO 20 01/06/2017    RBCUA NO CELLS SEEN 01/06/2017    MUCUS NEGATIVE 01/06/2017    BACTERIA MANY 01/06/2017    CLARITYU HAZY 01/06/2017    SPECGRAV 1.015 01/06/2017    LEUKOCYTESUR SMALL 01/06/2017    UROBILINOGEN 0.2 01/06/2017    BILIRUBINUR NEGATIVE 01/06/2017    BLOODU MODERATE 01/06/2017    KETUA NEGATIVE 01/06/2017     Urine Cultures: No results found for: LABURIN  Blood Cultures: No results found for: BC  No results found for: BLOODCULT2  Organism: No results found for: ORG    Time Spent Discharging patient 35 minutes    Electronically signed by Carmen Roa PA-C on 5/20/2022 at 11:37 AM